# Patient Record
Sex: FEMALE | Race: OTHER | HISPANIC OR LATINO | Employment: UNEMPLOYED | URBAN - METROPOLITAN AREA
[De-identification: names, ages, dates, MRNs, and addresses within clinical notes are randomized per-mention and may not be internally consistent; named-entity substitution may affect disease eponyms.]

---

## 2023-11-29 LAB
EXTERNAL HEMATOCRIT: 32.2 %
EXTERNAL HEMOGLOBIN: 11 G/DL
EXTERNAL HEPATITIS B SURFACE ANTIGEN: NONREACTIVE
EXTERNAL HIV-1/2 AB-AG: NORMAL
EXTERNAL PLATELET COUNT: 285 K/ÂΜL
EXTERNAL RUBELLA IGG QUANTITATION: NORMAL
HCV AB SER-ACNC: NEGATIVE

## 2024-03-29 ENCOUNTER — ROUTINE PRENATAL (OUTPATIENT)
Dept: OBGYN CLINIC | Facility: CLINIC | Age: 34
End: 2024-03-29

## 2024-03-29 VITALS
SYSTOLIC BLOOD PRESSURE: 95 MMHG | WEIGHT: 169 LBS | HEIGHT: 58 IN | HEART RATE: 88 BPM | RESPIRATION RATE: 18 BRPM | BODY MASS INDEX: 35.48 KG/M2 | DIASTOLIC BLOOD PRESSURE: 63 MMHG

## 2024-03-29 DIAGNOSIS — Z3A.25 25 WEEKS GESTATION OF PREGNANCY: Primary | ICD-10-CM

## 2024-03-29 PROBLEM — J45.909 ASTHMA: Status: ACTIVE | Noted: 2024-03-29

## 2024-03-29 PROCEDURE — PNV: Performed by: OBSTETRICS & GYNECOLOGY

## 2024-03-29 RX ORDER — ALBUTEROL SULFATE 90 UG/1
AEROSOL, METERED RESPIRATORY (INHALATION)
COMMUNITY
Start: 2024-02-16

## 2024-03-29 NOTE — PROGRESS NOTES
OB/GYN  PRENATAL H&P VISIT  Naresh Sarabia  3/29/2024  8:06 AM  Dr. Georgina Jasso MD      SUBJECTIVE  Patient is a  at 25w5d here for initial prenatal H&P. This is an intended and desired pregnancy. Patient is in a same-sex relationship and used a sperm donor and home insemination. They have spoken to a  about their plan for the donor's termination of parental rights, however they were told they need to wait until the baby is born.    She is currently doing well. She lives in an apartment with her wife. She works as a . She bends and lifts for work, otherwise she feels safe in her home and her work.     She denies a hx of STD/STI, denies a hx of TB or close contacts with persons with TB. She denies had MRSA.     She denies a family history of inheritable conditions such as physical or intellectual disabilities, birth defects, blood disorders, heart or neural tube defects. Two nieces with autism, one is her sister's daughter (mild) and the other is her brother's daughter (slightly more symptoms, however is functional and verbal).    She denies recent travel or travel planned in the near future.     She denies use of nicotine or recreational drug use. She denies use of ETOH.    She denies vaginal bleeding, cramping, leakage, abnormal discharge.     OB History   No obstetric history on file.       Review of Systems   Constitutional:  Negative for chills and fever.   HENT:  Negative for ear pain and sore throat.    Eyes:  Negative for pain and visual disturbance.   Respiratory:  Negative for cough and shortness of breath (Asthma, for which she sporadically uses inhalers).    Cardiovascular:  Negative for chest pain.   Gastrointestinal:  Negative for abdominal pain, blood in stool, constipation, diarrhea, nausea and vomiting.   Genitourinary:  Negative for dysuria and hematuria.   Musculoskeletal:  Negative for arthralgias and back pain.   Skin:  Negative for color change and rash.    Neurological:  Negative for seizures and syncope.   All other systems reviewed and are negative.      Past Medical History:   Diagnosis Date    Asthma        No past surgical history on file.    Social History     Socioeconomic History    Marital status: Single     Spouse name: Not on file    Number of children: Not on file    Years of education: Not on file    Highest education level: Not on file   Occupational History    Not on file   Tobacco Use    Smoking status: Never    Smokeless tobacco: Not on file   Substance and Sexual Activity    Alcohol use: No    Drug use: No    Sexual activity: Not on file   Other Topics Concern    Not on file   Social History Narrative    Not on file     Social Determinants of Health     Financial Resource Strain: Low Risk  (3/28/2024)    Overall Financial Resource Strain (CARDIA)     Difficulty of Paying Living Expenses: Not very hard   Food Insecurity: Patient Declined (3/28/2024)    Hunger Vital Sign     Worried About Running Out of Food in the Last Year: Patient declined     Ran Out of Food in the Last Year: Patient declined   Transportation Needs: No Transportation Needs (3/28/2024)    PRAPARE - Transportation     Lack of Transportation (Medical): No     Lack of Transportation (Non-Medical): No   Physical Activity: Not on file   Stress: Not on file   Social Connections: Not on file   Intimate Partner Violence: Not on file   Housing Stability: Patient Declined (3/28/2024)    Housing Stability Vital Sign     Unable to Pay for Housing in the Last Year: Patient declined     Number of Places Lived in the Last Year: 1     Unstable Housing in the Last Year: Patient declined       OBJECTIVE  Vitals:    03/29/24 0815   BP: 95/63   Pulse: 88   Resp: 18     GEN: The patient was alert, pleasant well-appearing female in no acute distress.   CV: Regular rate  PULM: nonlabored respirations  MSK: Normal gait  Skin: warm, dry  Neuro: no focal deficits  Psych: normal affect and judgement,  "cooperative      ASSESSMENT AND PLAN    33 y.o., , with BP 95/63 (BP Location: Left arm, Patient Position: Sitting, Cuff Size: Large)   Pulse 88   Resp 18   Ht 4' 10.25\" (1.48 m)   Wt 76.7 kg (169 lb) , at 25w5d here for her prenatal H&P.    Fundal Height: 24cm  FHT: 130 by Doppler    Pregnancy: H&P completed today. [facility] Labor expectations discussed with patient, including appointment schedule, nutrition, exercise, medications, sexual intercourse, and nausea/vomiting.    Weight gain: Patient's BMI is 35; however this is not her pre-pregnancy BMI. Plan now to watch for weight gain; plan for 11-20lbs.     Screening: Pap smear due; patient is requesting to complete it after pregnancy. Last pap smear 2018, NILM. GC/CT neg in early pregnancy. Genetic screening reviewed with patient - NIPT low risk, [facility]     Depression Screening Follow-up Plan: Patient's depression screening was positive with a PHQ-2 score of 0. Their PHQ-9 score was 0. Clinically patient does not have depression. No treatment is required.    Consents: Delivery process including potential OVD and  reviewed. Sign delivery consent form at 28 weeks.    Labor: For analgesia, patient considering epidural.    Contraception: Different methods of contraception were discussed with patient, including progesterone only oral pills, depo provera, nexplanon, mirena, and paragard. Patient is in a same-sex relationship and does not plan on using contraception after delivery.     Follow up: RTC in 2 weeks. Precautions regarding labor, leakage, bleeding, and fetal movement reviewed.    Georgina Jasso MD  3/29/2024  8:06 AM       "

## 2024-03-29 NOTE — LETTER
March 29, 2024     Patient: Naresh Sarabia  Date of Visit: 3/29/2024      To Whom it May Concern:    Naresh Sarabia is under my professional care. Naresh was seen in my office on 3/29/2024.    We recommend that all pregnant women:    1. Have a well-ventilated workspace.  2. Wear low-heeled shoes.  3. Work no more than 40 hours per week.  4. Have a 15 minute break every 2 hours and at least 30 minutes for a meal break.   5. Use good body mechanics by bending at your knees to avoid back strain and lift no more than 20 pounds without assistance.  6. Have ready access to bathrooms and water.    She may continue to work until her due date unless medical complications arise. We anticipate she may return to work in 6-8 weeks after delivery.         Sincerely,     Georgina Jasso MD

## 2024-03-31 PROBLEM — Z3A.25 25 WEEKS GESTATION OF PREGNANCY: Status: ACTIVE | Noted: 2024-03-31

## 2024-03-31 PROBLEM — Z78.9 NONIMMUNE TO HEPATITIS B VIRUS: Status: ACTIVE | Noted: 2024-03-31

## 2024-04-04 ENCOUNTER — TELEPHONE (OUTPATIENT)
Dept: OBGYN CLINIC | Facility: CLINIC | Age: 34
End: 2024-04-04

## 2024-04-04 DIAGNOSIS — Z34.92 PRENATAL CARE IN SECOND TRIMESTER: Primary | ICD-10-CM

## 2024-04-04 NOTE — TELEPHONE ENCOUNTER
Pt called office today requesting a more descriptive work note. Pt states the various positions at work are making her sore. I advised pt to message provider on my chart to explain exactly what limitations she is requesting.

## 2024-04-05 ENCOUNTER — TELEPHONE (OUTPATIENT)
Age: 34
End: 2024-04-05

## 2024-04-08 ENCOUNTER — APPOINTMENT (OUTPATIENT)
Dept: LAB | Facility: CLINIC | Age: 34
End: 2024-04-08
Payer: COMMERCIAL

## 2024-04-08 DIAGNOSIS — Z3A.25 25 WEEKS GESTATION OF PREGNANCY: ICD-10-CM

## 2024-04-08 LAB
ERYTHROCYTE [DISTWIDTH] IN BLOOD BY AUTOMATED COUNT: 13.3 % (ref 11.6–15.1)
GLUCOSE 1H P 50 G GLC PO SERPL-MCNC: 183 MG/DL (ref 70–134)
HCT VFR BLD AUTO: 32 % (ref 34.8–46.1)
HGB BLD-MCNC: 10.6 G/DL (ref 11.5–15.4)
MCH RBC QN AUTO: 31.7 PG (ref 26.8–34.3)
MCHC RBC AUTO-ENTMCNC: 33.1 G/DL (ref 31.4–37.4)
MCV RBC AUTO: 96 FL (ref 82–98)
PLATELET # BLD AUTO: 239 THOUSANDS/UL (ref 149–390)
PMV BLD AUTO: 11.2 FL (ref 8.9–12.7)
RBC # BLD AUTO: 3.34 MILLION/UL (ref 3.81–5.12)
TREPONEMA PALLIDUM IGG+IGM AB [PRESENCE] IN SERUM OR PLASMA BY IMMUNOASSAY: NORMAL
WBC # BLD AUTO: 11.14 THOUSAND/UL (ref 4.31–10.16)

## 2024-04-08 PROCEDURE — 83020 HEMOGLOBIN ELECTROPHORESIS: CPT

## 2024-04-08 PROCEDURE — 82950 GLUCOSE TEST: CPT

## 2024-04-08 PROCEDURE — 85027 COMPLETE CBC AUTOMATED: CPT

## 2024-04-08 PROCEDURE — 36415 COLL VENOUS BLD VENIPUNCTURE: CPT

## 2024-04-08 PROCEDURE — 86780 TREPONEMA PALLIDUM: CPT

## 2024-04-09 ENCOUNTER — TELEPHONE (OUTPATIENT)
Age: 34
End: 2024-04-09

## 2024-04-09 DIAGNOSIS — O24.419 GESTATIONAL DIABETES MELLITUS (GDM) IN SECOND TRIMESTER, GESTATIONAL DIABETES METHOD OF CONTROL UNSPECIFIED: Primary | ICD-10-CM

## 2024-04-10 ENCOUNTER — TELEPHONE (OUTPATIENT)
Age: 34
End: 2024-04-10

## 2024-04-10 LAB
HGB A MFR BLD: 2.2 % (ref 1.8–3.2)
HGB A MFR BLD: 97.8 % (ref 96.4–98.8)
HGB F MFR BLD: 0 % (ref 0–2)
HGB FRACT BLD-IMP: NORMAL
HGB S MFR BLD: 0 %

## 2024-04-10 NOTE — TELEPHONE ENCOUNTER
Spoke with patient to schedule appointments for her today. She seen her glucose level on her my chart. She has concerns about the GDM affecting her babies weight and if there are any other side effects that could hurt the baby long term due to her GDM.

## 2024-04-11 ENCOUNTER — PATIENT MESSAGE (OUTPATIENT)
Dept: PERINATAL CARE | Facility: CLINIC | Age: 34
End: 2024-04-11

## 2024-04-11 ENCOUNTER — TELEMEDICINE (OUTPATIENT)
Dept: PERINATAL CARE | Facility: CLINIC | Age: 34
End: 2024-04-11
Payer: COMMERCIAL

## 2024-04-11 DIAGNOSIS — O99.212 OTHER OBESITY DUE TO EXCESS CALORIES AFFECTING PREGNANCY IN SECOND TRIMESTER: ICD-10-CM

## 2024-04-11 DIAGNOSIS — E66.09 OTHER OBESITY DUE TO EXCESS CALORIES AFFECTING PREGNANCY IN SECOND TRIMESTER: ICD-10-CM

## 2024-04-11 DIAGNOSIS — O24.410 DIET CONTROLLED GESTATIONAL DIABETES MELLITUS (GDM) IN SECOND TRIMESTER: Primary | ICD-10-CM

## 2024-04-11 DIAGNOSIS — Z3A.27 27 WEEKS GESTATION OF PREGNANCY: ICD-10-CM

## 2024-04-11 PROCEDURE — G0109 DIAB MANAGE TRN IND/GROUP: HCPCS | Performed by: DIETITIAN, REGISTERED

## 2024-04-11 RX ORDER — BLOOD SUGAR DIAGNOSTIC
STRIP MISCELLANEOUS
Qty: 100 STRIP | Refills: 5 | Status: SHIPPED | OUTPATIENT
Start: 2024-04-11 | End: 2024-07-07

## 2024-04-11 RX ORDER — LANCETS 33 GAUGE
EACH MISCELLANEOUS
Qty: 100 EACH | Refills: 5 | Status: SHIPPED | OUTPATIENT
Start: 2024-04-11 | End: 2024-07-07

## 2024-04-11 RX ORDER — BLOOD-GLUCOSE METER
EACH MISCELLANEOUS
Qty: 1 KIT | Refills: 0 | Status: SHIPPED | OUTPATIENT
Start: 2024-04-11 | End: 2024-07-07

## 2024-04-11 NOTE — PROGRESS NOTES
Virtual Regular Visit    Verification of patient location: [place]    Patient is located at Home in the following state in which I hold an active license [place]      Assessment/Plan:    Problem List Items Addressed This Visit    None           Reason for visit is   Chief Complaint   Patient presents with    Virtual Regular Visit          Encounter provider Susu Husain    Provider located at [address] [address] [phone]      Recent Visits  No visits were found meeting these conditions.  Showing recent visits within past 7 days and meeting all other requirements  Today's Visits  Date Type Provider Dept   24 Telemedicine Susu Husain [facility]   Showing today's visits and meeting all other requirements  Future Appointments  No visits were found meeting these conditions.  Showing future appointments within next 150 days and meeting all other requirements       The patient was identified by name and date of birth. Naresh Sarabia was informed that this is a telemedicine visit and that the visit is being conducted through the [employer] platform. She agrees to proceed..  My office door was closed. No one else was in the room.  She acknowledged consent and understanding of privacy and security of the video platform. The patient has agreed to participate and understands they can discontinue the visit at any time.    Patient is aware this is a billable service.     Subjective  Naresh Sarabia is a 33 y.o. female pregnant patient.      HPI     Past Medical History:   Diagnosis Date    Asthma        Past Surgical History:   Procedure Laterality Date    LASIK Bilateral        Current Outpatient Medications   Medication Sig Dispense Refill    albuterol (PROVENTIL HFA,VENTOLIN HFA) 90 mcg/act inhaler       Aspirin 81 MG CAPS Take by mouth      FOLIC ACID PO Take by mouth      Omega-3 Fatty Acids (FISH OIL  "CONCENTRATE PO) Take by mouth      Prenat MV-Min w/Fe-Folate-DHA (PRENATAL COMPLETE PO) Take by mouth       No current facility-administered medications for this visit.        Allergies   Allergen Reactions    Other Rash, Shortness Of Breath and Wheezing       Review of Systems    Video Exam    There were no vitals filed for this visit.    Physical Exam --not performed.    Visit Time  Total Visit Duration: 60 minutes        Thank you for referring your patient to [facility] Maternal [facility]etal Medicine Diabetes in Pregnancy Program.     Naresh Sarabia is a  33 y.o. female who presents today for Group Class 1.  Patient is at 27w4d gestation, Estimated Date of Delivery: 24.     Reviewed and updated the following from patients medical record: PMH, Problem List, Allergies, and Current Medications.    Visit Diagnosis:  Diet controlled GDM    Discussed with patient pathophysiology of GDM, untreated hyperglycemia in pregnancy and maternal fetal complications including fetal macrosomia,  hypoglycemia, polyhydramnios, increased incidence of  section,  labor, and in severe cases fetal demise and still birth . Discussed importance of blood glucose monitoring, nutrition, and medication if necessary in achieving BG goals.     Additional Pregnancy Complications:  Obesity    Labs:    Lab Results   Component Value Date    NCE3OEUS03SG 183 (H) 2024       No results found for: \"GLUF\", \"ZABPJBB8IF\", \"PXTCWNV7RG\", \"NXSPLDR9ZC\"     No components found for: \"HGA1C\"    Medications:  No diabetes related medications    Anthropometrics:  Ht Readings from Last 3 Encounters:   24 4' 10.25\" (1.48 m)     Wt Readings from Last 3 Encounters:   24 76.7 kg (169 lb)   10/11/16 59.4 kg (130 lb 15.3 oz)     Pre-gravid weight: 153 pounds at 23 appointment at [facility]   Pre-gravid BMI: 30.9  Weight Change: gained 16 pounds  Weight gain recommendations: BMI (> 30) 11-20 lbs  Comments: may gain 4 more pounds for " the remainder of the pregnancy    Recent Ultra Sound Results:  Next US date: 24    Blood Glucose Monitoring:   Glucose Meter: OneTouch Verio Flex  Instructed on testing blood sugars: 4 x per day (Fasting, 2 hour after start of each meal)    Gave instruction on site selection, skin preparation, loading strips and lancet device, meter activation, obtaining blood sample, test strip and lancet disposal and storage, and recording log book entries. Patient has good understanding of material covered and was able to test their own blood sugar in office today.     Instruction for reporting blood sugar results weekly via:  Phone: [phone]   OR  My Chart (Message with image attachment, or Glucose Flowsheet)    Goal Blood Sugar Ranges:   Fastin-90 mg/dL  1 hour after the start of each meal: 140 mg/dL or less  2 hours after start of each meal: 120 mg/dL or less    Meal Plan (daily calorie and protein needs):  Calories: 1800 calorie (CHO: 45-15-45-15-45-30) (PRO:2-1-3-1-3-2)    Type of Diet:Regular  Additional Nutrition Concerns: none    Meal Plan Tips:  1. Patient was provided with a meal plan including 3 meals and 3 snacks.  2. Discussed appropriate amounts of CHO, PRO, and Fat at each meal and snack.   3. Reviewed CHO exchange list, and portion sizes for both CHO and PRO via food models  4. Instruction on how to read a food label  5. Provided suggested meal/snack options to increase nutrition and maintain consistent meal and snack intakes.  6. Instructed on how to keep a 3-day food diary to be brought to follow- up appointment.   7. Encouraged  patient to eat every 2.0-3.5 hours while awake  8. Encouraged patient to go no longer than 8-10 hours fasting overnight until first meal of the day.      Physical Activity:  Discussed benefits of physical activity to optimize blood glucose control, encouraged activity at patient is physically able. Always consult a physician prior to starting an exercise program.  "Recommend 20-30 minutes daily.    Patient Stated Goal: \"I will involve my family in my diabetes care\"    Diabetes Self Management Support Plan outside of ongoing care: Spouse/Family    Learner/s Present:Learners Present: Patient   Barriers to Learning/Change: No Barriers  Expected Compliance: good    Date to report blood sugars: Wednesday, 4/17/24  Class 2 (date): Thursday, 4/18/23    Begin Time: 1:05 PM  End Time: 2:10 PM    It was a pleasure working with them today. Please feel free to call with any questions or concerns.    Susu Husain, MS, RD, [facility]  Diabetes Educator  [facility]  Diabetes in Pregnancy Program  [address], [address]  [place] [address]         "

## 2024-04-15 ENCOUNTER — ROUTINE PRENATAL (OUTPATIENT)
Dept: OBGYN CLINIC | Facility: CLINIC | Age: 34
End: 2024-04-15

## 2024-04-15 VITALS
BODY MASS INDEX: 35.26 KG/M2 | DIASTOLIC BLOOD PRESSURE: 57 MMHG | HEART RATE: 97 BPM | SYSTOLIC BLOOD PRESSURE: 97 MMHG | WEIGHT: 168 LBS | HEIGHT: 58 IN

## 2024-04-15 DIAGNOSIS — Z3A.28 28 WEEKS GESTATION OF PREGNANCY: Primary | ICD-10-CM

## 2024-04-15 DIAGNOSIS — O99.019 ANTEPARTUM ANEMIA: ICD-10-CM

## 2024-04-15 DIAGNOSIS — O24.410 DIET CONTROLLED GESTATIONAL DIABETES MELLITUS (GDM) IN SECOND TRIMESTER: ICD-10-CM

## 2024-04-15 PROCEDURE — PNV: Performed by: OBSTETRICS & GYNECOLOGY

## 2024-04-15 RX ORDER — FERROUS SULFATE 324(65)MG
324 TABLET, DELAYED RELEASE (ENTERIC COATED) ORAL
Qty: 90 TABLET | Refills: 1 | Status: SHIPPED | OUTPATIENT
Start: 2024-04-15

## 2024-04-15 NOTE — PROGRESS NOTES
28 week education packet provided to patient on 04/15/24.    Included in packet:  Third Trimester paperwork  Delivery consent   Birthing room support person rules and acknowledgment  Birth Plan   Welcome information  Birth certificate worksheet   Consent for Photographers  Perineal/ Vaginal massage   Pediatric practices and locations      Delined TDAP today  Breast pump ordrd / will choose from link

## 2024-04-15 NOTE — PROGRESS NOTES
OB/GYN  PN Visit  Naresh Sarabia  [id number]  4/15/2024  12:14 PM  Dr. Modesta Lilly, DO    S: 33 y.o.  28w1d here for PN visit. She denies contractions. She denies  leakage of fluid and vaginal bleeding, endorses discharge throughout the pregnancy. She endorses good fetal movement. Her pregnancy is complicatedby gestational diabetes, diet controlled.     She requests paperwork be completed for work stating she is not to lift greater than 20lbs or bend/squat at work. She will provide the paperwork from her job.     O:  Vitals:    04/15/24 1100   BP: 97/57   Pulse: 97     Physical Exam  Constitutional:       General: She is not in acute distress.     Appearance: Normal appearance. She is not ill-appearing or toxic-appearing.   HENT:      Head: Normocephalic and atraumatic.      Right Ear: External ear normal.      Left Ear: External ear normal.      Nose: Nose normal.      Mouth/Throat:      Mouth: Mucous membranes are moist.      Pharynx: Oropharynx is clear.   Eyes:      General: No scleral icterus.     Conjunctiva/sclera: Conjunctivae normal.   Cardiovascular:      Rate and Rhythm: Normal rate and regular rhythm.      Heart sounds: Normal heart sounds. No murmur heard.  Pulmonary:      Effort: Pulmonary effort is normal. No respiratory distress.      Breath sounds: Normal breath sounds. No wheezing, rhonchi or rales.   Abdominal:      General: Bowel sounds are normal.      Palpations: Abdomen is soft.      Tenderness: There is no abdominal tenderness. There is no guarding.      Comments: gravid   Musculoskeletal:         General: Normal range of motion.      Cervical back: Neck supple.      Right lower leg: Edema (nonpitting) present.      Left lower leg: Edema (nonpitting) present.   Skin:     General: Skin is warm and dry.      Coloration: Skin is not jaundiced.   Neurological:      General: No focal deficit present.      Mental Status: She is alert and oriented to person, place, and time.    Psychiatric:         Mood and Affect: Mood normal.         Behavior: Behavior normal.       Fundal height: 29cm  FHT: 143     A/P:  1. 28w1d GESTATION  - Offered Tdap today, pt defers until next visit  - Labor precautions reviewed  - Fetal kick counts reviewed  -Fetal growth scans scheduled for  and   - RTC in 2 weeks    2. Gestational diabetes   -1 hour glucose: 183, patient has been checking blood sugars at home, diet controlled. Requests repeat today  -3 hour glucola ordered today  -appointment with GDM counseling on       Future Appointments   Date Time Provider Department Center   2024 10:00 AM Susu Husain   [facility]   2024  9:45 AM  US 1 HARESH  PERINATMO [facility]   2024 10:30 AM EDE [facility]   2024 10:00 AM  US 1 JEAN-PAUL AN [facility]  4/15/2024  12:14 PM

## 2024-04-16 LAB
DME PARACHUTE DELIVERY DATE REQUESTED: NORMAL
DME PARACHUTE ITEM DESCRIPTION: NORMAL
DME PARACHUTE ORDER STATUS: NORMAL
DME PARACHUTE SUPPLIER NAME: NORMAL
DME PARACHUTE SUPPLIER PHONE: NORMAL

## 2024-04-18 ENCOUNTER — TELEMEDICINE (OUTPATIENT)
Dept: PERINATAL CARE | Facility: CLINIC | Age: 34
End: 2024-04-18
Payer: COMMERCIAL

## 2024-04-18 DIAGNOSIS — O24.410 DIET CONTROLLED GESTATIONAL DIABETES MELLITUS (GDM) IN SECOND TRIMESTER: Primary | ICD-10-CM

## 2024-04-18 DIAGNOSIS — O99.213 OTHER OBESITY DUE TO EXCESS CALORIES AFFECTING PREGNANCY IN THIRD TRIMESTER: ICD-10-CM

## 2024-04-18 DIAGNOSIS — Z3A.28 28 WEEKS GESTATION OF PREGNANCY: ICD-10-CM

## 2024-04-18 DIAGNOSIS — E66.09 OTHER OBESITY DUE TO EXCESS CALORIES AFFECTING PREGNANCY IN THIRD TRIMESTER: ICD-10-CM

## 2024-04-18 PROCEDURE — G0108 DIAB MANAGE TRN  PER INDIV: HCPCS | Performed by: DIETITIAN, REGISTERED

## 2024-04-23 ENCOUNTER — ULTRASOUND (OUTPATIENT)
Dept: PERINATAL CARE | Facility: OTHER | Age: 34
End: 2024-04-23
Payer: COMMERCIAL

## 2024-04-23 VITALS
BODY MASS INDEX: 34.19 KG/M2 | WEIGHT: 169.6 LBS | DIASTOLIC BLOOD PRESSURE: 48 MMHG | HEART RATE: 94 BPM | HEIGHT: 59 IN | SYSTOLIC BLOOD PRESSURE: 98 MMHG

## 2024-04-23 DIAGNOSIS — O24.410 DIET CONTROLLED GESTATIONAL DIABETES MELLITUS (GDM) IN THIRD TRIMESTER: Primary | ICD-10-CM

## 2024-04-23 DIAGNOSIS — O98.511 COVID-19 AFFECTING PREGNANCY IN FIRST TRIMESTER: ICD-10-CM

## 2024-04-23 DIAGNOSIS — U07.1 COVID-19 AFFECTING PREGNANCY IN FIRST TRIMESTER: ICD-10-CM

## 2024-04-23 DIAGNOSIS — Z3A.29 29 WEEKS GESTATION OF PREGNANCY: ICD-10-CM

## 2024-04-23 PROBLEM — J45.909 MATERNAL ASTHMA COMPLICATING PREGNANCY: Status: ACTIVE | Noted: 2023-10-30

## 2024-04-23 PROBLEM — O99.519 MATERNAL ASTHMA COMPLICATING PREGNANCY: Status: ACTIVE | Noted: 2023-10-30

## 2024-04-23 PROCEDURE — 76816 OB US FOLLOW-UP PER FETUS: CPT | Performed by: OBSTETRICS & GYNECOLOGY

## 2024-04-23 PROCEDURE — 99244 OFF/OP CNSLTJ NEW/EST MOD 40: CPT | Performed by: OBSTETRICS & GYNECOLOGY

## 2024-04-23 NOTE — LETTER
April 23, 2024     Evelyn Mendoza DO  [address]  [address]  [facility] [facility] [facility] [address]    Patient: Naresh Sarabia   YOB: 1990   Date of Visit: 4/23/2024       Dear Dr. Mendoza:    Thank you for referring Naresh Sarabia to me for evaluation. Below are my notes for this consultation.    If you have questions, please do not hesitate to call me. I look forward to following your patient along with you.         Sincerely,        Brianna Beck MD        CC: No Recipients    Brianna Beck MD  4/23/2024  7:50 PM  Incomplete  Naresh Sarabia has symptoms of carpal tunnel affecting her first 3 digits.  She reports regular fetal movements and does not report any problems.  She is here today at 29w2d for an ultrasound for fetal growth.  She Is here today with Melly her spouse.    Problem list:  GDM A1-reported sugars from 4 11-4 17 show she is under good control on diet.  Glucola was 183 on 4/8/2024.  Hemoglobin A1c in early pregnancy on 1/29/2023 was 5.3.  She has had prediabetes based on hemoglobin A1c's of 5.7 and 5.9 in the past prior to pregnancy.   Recent transfer of care  Obesity based on a pregravid BMI of 30 on baby aspirin 81 mg daily  History of COVID in the first trimester of this pregnancy  Maternal anemia with a hemoglobin of 10.6 on 4/8/2024 and a normal hemoglobin fractionation    Naresh is a recent transfer of care from [facility] to [facility].  She currently is on iron, prenatal vitamins, aspirin 81 mg daily, folic acid, and omega-3 fatty acids and has an albuterol inhaler prn. She reports a history of asthma, GDM A1 diagnosed this pregnancy and hx of Lasix surgery.  Lab work from [facility] shows a mildly increased TSH of 3.79 on 11/29/2023. This is her first pregnancy with donor sperm.  She is in a same-sex relationship with Melly Gibson.  She reports no significant family history.  She reports no substance use.    Ultrasound  findings:  The ultrasound today shows normal interval fetal growth and fluid.  She had a complete anatomy scan at [facility].     Pregnancy ultrasound has limitations and is unable to detect all forms of fetal congenital abnormalities.  The inaccuracy in the EFW can be off by 1 lb either way in the third trimester.    Specific counseling was provided on the following problems:  1.  US for growth and fluid are appropriate today. If 30% or more FBS are >95 or 2 hr pp are >120 she will be started on insulin or a oral hypoglycemic such as metformin.  If medications are required to control her diabetes she will require twice weekly fetal testing with NST's from 32 weeks on. I would also recommend delivery by her due date.    If she does not require any medications to control her diabetes then she can liver can occur between 39 to 41 weeks.  Recommend fetal testing start at 40 weeks.     Postnatally after delivery, most patients with gestational diabetes can stop their insulin and gestational diabetes diet. Recommend she complete a 2 hour glucose tolerance test at 6 weeks postpartum prove her gestational diabetes has resolved.    Patients with gestational diabetes have a higher risk for developing overt diabetes in the future. Recommend she be screened for diabetes yearly.  2.  Carpal tunnel usually will resolve when pregnancy is over.  Review of her hands shows normal padding over her thumb suggesting adequate innervation.  Suggested she invest in wrist guards to keep her wrist straight.  Recommend she use them when she is sleeping and can use them all day long if they improve her symptoms.     Follow up recommended:   Recommend a follow-up ultrasound in 5 weeks for growth.  She currently is on aspirin 81 mg for the indication of COVID she thought in pregnancy.  Based on her BMI pregravid of 30 and this being her first pregnancy it is reasonable to continue this dose also to decrease her risk for developing  preeclampsia.  There will be no benefit at this point to increase her dose to 162 mg daily.  Recommend stopping at 36 weeks and 0 days.    Pre visit time reviewing her records   7 minutes  Face to face time 14 minutes  Post visit time on documentation of note, updating her problem list, adding orders and prescriptions 10 minutes.  Procedures that were completed today were charged separately.   The level of decision making was low complexity    Brianna Beck MD

## 2024-04-23 NOTE — LETTER
April 23, 2024     Evelyn Mendoza DO  [address]  [address]  [place] [address]    Patient: Naresh Sarabia   YOB: 1990   Date of Visit: 4/23/2024       Dear Dr. Mendoza:    Thank you for referring Naresh Sarabia to me for evaluation. Below are my notes for this consultation.    If you have questions, please do not hesitate to call me. I look forward to following your patient along with you.         Sincerely,        Brianna Beck MD        CC: No Recipients    Brianna Beck MD  4/23/2024  7:47 PM  Incomplete  Naresh Sarabia has symptoms of carpal tunnel affecting her first 3 digits.  She reports regular fetal movements and does not report any problems.  She is here today at 29w2d for an ultrasound for fetal growth.  She Is here today with Melly her spouse.    Problem list:  GDM A1-reported sugars from 4 11-4 17 show she is under good control on diet.  Glucola was 183 on 4/8/2024.  Hemoglobin A1c in early pregnancy on 1/29/2023 was 5.3.  She has had prediabetes based on hemoglobin A1c's of 5.7 and 5.9 in the past prior to pregnancy.   Recent transfer of care  Obesity based on a pregravid BMI of 30 on baby aspirin 81 mg daily  History of COVID in the first trimester of this pregnancy  Maternal anemia with a hemoglobin of 10.6 on 4/8/2024 and a normal hemoglobin fractionation    Naresh is a recent transfer of care from [facility] to [facility].  She currently is on iron, prenatal vitamins, aspirin 81 mg daily, folic acid, and omega-3 fatty acids and has an albuterol inhaler prn. She reports a history of asthma, GDM A1 diagnosed this pregnancy and hx of Lasix surgery.  Lab work from [facility] shows a mildly increased TSH of 3.79 on 11/29/2023. This is her first pregnancy with donor sperm.  She is in a same-sex relationship with Melly Gibson.  She reports no significant family history.  She reports no substance use.    Ultrasound  findings:  The ultrasound today shows normal interval fetal growth and fluid.  She had a complete anatomy scan at [facility].     Pregnancy ultrasound has limitations and is unable to detect all forms of fetal congenital abnormalities.  The inaccuracy in the EFW can be off by 1 lb either way in the third trimester.    Specific counseling was provided on the following problems:  1.  US for growth and fluid are appropriate today. If 30% or more FBS are >95 or 2 hr pp are >120 she will be started on insulin or a oral hypoglycemic such as metformin.  If medications are required to control her diabetes she will require twice weekly fetal testing with NST's from 32 weeks on. I would also recommend delivery by her due date.    If she does not require any medications to control her diabetes then she can liver can occur between 39 to 41 weeks.  Recommend fetal testing start at 40 weeks.     Postnatally after delivery, most patients with gestational diabetes can stop their insulin and gestational diabetes diet. Recommend she complete a 2 hour glucose tolerance test at 6 weeks postpartum prove her gestational diabetes has resolved.    Patients with gestational diabetes have a higher risk for developing overt diabetes in the future. Recommend she be screened for diabetes yearly.  2.  Carpal tunnel usually will resolve when pregnancy is over.  Review of her hands shows normal padding over her thumb suggesting adequate innervation.  Suggested she invest in wrist guards to keep her wrist straight.  Recommend she use them when she is sleeping and can use them all day long if they improve her symptoms.     Follow up recommended:   Recommend a follow-up ultrasound in 5 weeks for growth.  She currently is on aspirin 81 mg for the indication of COVID she thought in pregnancy.  Based on her BMI pregravid of 30 and this being her first pregnancy it is reasonable to continue this dose also to decrease her risk for developing  preeclampsia.  There will be no benefit at this point to increase her dose to 162 mg daily.    Pre visit time reviewing her records   7 minutes  Face to face time 14 minutes  Post visit time on documentation of note, updating her problem list, adding orders and prescriptions 10 minutes.  Procedures that were completed today were charged separately.   The level of decision making was low complexity    Brianna Beck MD

## 2024-04-23 NOTE — PROGRESS NOTES
Naresh Sarabia has symptoms of carpal tunnel affecting her first 3 digits.  She reports regular fetal movements and does not report any problems.  She is here today at 29w2d for an ultrasound for fetal growth.  She Is here today with Melly her spouse.    Problem list:  GDM A1-reported sugars from 4 11-4 17 show she is under good control on diet.  Glucola was 183 on 4/8/2024.  Hemoglobin A1c in early pregnancy on 1/29/2023 was 5.3.  She has had prediabetes based on hemoglobin A1c's of 5.7 and 5.9 in the past prior to pregnancy.   Recent transfer of care  Obesity based on a pregravid BMI of 30 on baby aspirin 81 mg daily  History of COVID in the first trimester of this pregnancy  Maternal anemia with a hemoglobin of 10.6 on 4/8/2024 and a normal hemoglobin fractionation    Naresh is a recent transfer of care from [facility] to [facility].  She currently is on iron, prenatal vitamins, aspirin 81 mg daily, folic acid, and omega-3 fatty acids and has an albuterol inhaler prn. She reports a history of asthma, GDM A1 diagnosed this pregnancy and hx of Lasix surgery.  Lab work from [facility] shows a mildly increased TSH of 3.79 on 11/29/2023. This is her first pregnancy with donor sperm.  She is in a same-sex relationship with Melly Gibson.  She reports no significant family history.  She reports no substance use.    Ultrasound findings:  The ultrasound today shows normal interval fetal growth and fluid.  She had a complete anatomy scan at [facility].     Pregnancy ultrasound has limitations and is unable to detect all forms of fetal congenital abnormalities.  The inaccuracy in the EFW can be off by 1 lb either way in the third trimester.    Specific counseling was provided on the following problems:  1.  US for growth and fluid are appropriate today. If 30% or more FBS are >95 or 2 hr pp are >120 she will be started on insulin or a oral hypoglycemic such as metformin.  If  medications are required to control her diabetes she will require twice weekly fetal testing with NST's from 32 weeks on. I would also recommend delivery by her due date.    If she does not require any medications to control her diabetes then she can liver can occur between 39 to 41 weeks.  Recommend fetal testing start at 40 weeks.     Postnatally after delivery, most patients with gestational diabetes can stop their insulin and gestational diabetes diet. Recommend she complete a 2 hour glucose tolerance test at 6 weeks postpartum prove her gestational diabetes has resolved.    Patients with gestational diabetes have a higher risk for developing overt diabetes in the future. Recommend she be screened for diabetes yearly.  2.  Carpal tunnel usually will resolve when pregnancy is over.  Review of her hands shows normal padding over her thumb suggesting adequate innervation.  Suggested she invest in wrist guards to keep her wrist straight.  Recommend she use them when she is sleeping and can use them all day long if they improve her symptoms.     Follow up recommended:   Recommend a follow-up ultrasound in 5 weeks for growth.  She currently is on aspirin 81 mg for the indication of COVID she thought in pregnancy.  Based on her BMI pregravid of 30 and this being her first pregnancy it is reasonable to continue this dose also to decrease her risk for developing preeclampsia.  There will be no benefit at this point to increase her dose to 162 mg daily.  Recommend stopping at 36 weeks and 0 days.    Pre visit time reviewing her records   7 minutes  Face to face time 14 minutes  Post visit time on documentation of note, updating her problem list, adding orders and prescriptions 10 minutes.  Procedures that were completed today were charged separately.   The level of decision making was low complexity    Brianna Beck MD

## 2024-04-23 NOTE — LETTER
April 23, 2024     Evelyn Mendoza DO  [address]  [address]  [place] [address]    Patient: Naresh Sarabia   YOB: 1990   Date of Visit: 4/23/2024       Dear Dr. Mendoza:    Thank you for referring Naresh Sarabia to me for evaluation. Below are my notes for this consultation.    If you have questions, please do not hesitate to call me. I look forward to following your patient along with you.         Sincerely,        Brianna Beck MD        CC: No Recipients    Brianna Beck MD  4/23/2024  7:50 PM  Incomplete  Naresh Sarabia has symptoms of carpal tunnel affecting her first 3 digits.  She reports regular fetal movements and does not report any problems.  She is here today at 29w2d for an ultrasound for fetal growth.  She Is here today with Melly her spouse.    Problem list:  GDM A1-reported sugars from 4 11-4 17 show she is under good control on diet.  Glucola was 183 on 4/8/2024.  Hemoglobin A1c in early pregnancy on 1/29/2023 was 5.3.  She has had prediabetes based on hemoglobin A1c's of 5.7 and 5.9 in the past prior to pregnancy.   Recent transfer of care  Obesity based on a pregravid BMI of 30 on baby aspirin 81 mg daily  History of COVID in the first trimester of this pregnancy  Maternal anemia with a hemoglobin of 10.6 on 4/8/2024 and a normal hemoglobin fractionation    Naresh is a recent transfer of care from [facility] to [facility].  She currently is on iron, prenatal vitamins, aspirin 81 mg daily, folic acid, and omega-3 fatty acids and has an albuterol inhaler prn. She reports a history of asthma, GDM A1 diagnosed this pregnancy and hx of Lasix surgery.  Lab work from [facility] shows a mildly increased TSH of 3.79 on 11/29/2023. This is her first pregnancy with donor sperm.  She is in a same-sex relationship with Melly Gibson.  She reports no significant family history.  She reports no substance use.    Ultrasound  findings:  The ultrasound today shows normal interval fetal growth and fluid.  She had a complete anatomy scan at [facility].     Pregnancy ultrasound has limitations and is unable to detect all forms of fetal congenital abnormalities.  The inaccuracy in the EFW can be off by 1 lb either way in the third trimester.    Specific counseling was provided on the following problems:  1.  US for growth and fluid are appropriate today. If 30% or more FBS are >95 or 2 hr pp are >120 she will be started on insulin or a oral hypoglycemic such as metformin.  If medications are required to control her diabetes she will require twice weekly fetal testing with NST's from 32 weeks on. I would also recommend delivery by her due date.    If she does not require any medications to control her diabetes then she can liver can occur between 39 to 41 weeks.  Recommend fetal testing start at 40 weeks.     Postnatally after delivery, most patients with gestational diabetes can stop their insulin and gestational diabetes diet. Recommend she complete a 2 hour glucose tolerance test at 6 weeks postpartum prove her gestational diabetes has resolved.    Patients with gestational diabetes have a higher risk for developing overt diabetes in the future. Recommend she be screened for diabetes yearly.  2.  Carpal tunnel usually will resolve when pregnancy is over.  Review of her hands shows normal padding over her thumb suggesting adequate innervation.  Suggested she invest in wrist guards to keep her wrist straight.  Recommend she use them when she is sleeping and can use them all day long if they improve her symptoms.     Follow up recommended:   Recommend a follow-up ultrasound in 5 weeks for growth.  She currently is on aspirin 81 mg for the indication of COVID she thought in pregnancy.  Based on her BMI pregravid of 30 and this being her first pregnancy it is reasonable to continue this dose also to decrease her risk for developing  preeclampsia.  There will be no benefit at this point to increase her dose to 162 mg daily.  Recommend stopping at 36 weeks and 0 days.    Pre visit time reviewing her records   7 minutes  Face to face time 14 minutes  Post visit time on documentation of note, updating her problem list, adding orders and prescriptions 10 minutes.  Procedures that were completed today were charged separately.   The level of decision making was low complexity    Brianna Beck MD

## 2024-04-29 ENCOUNTER — PATIENT OUTREACH (OUTPATIENT)
Dept: OBGYN CLINIC | Facility: CLINIC | Age: 34
End: 2024-04-29

## 2024-04-29 ENCOUNTER — ROUTINE PRENATAL (OUTPATIENT)
Dept: OBGYN CLINIC | Facility: CLINIC | Age: 34
End: 2024-04-29

## 2024-04-29 VITALS
SYSTOLIC BLOOD PRESSURE: 107 MMHG | BODY MASS INDEX: 33.34 KG/M2 | DIASTOLIC BLOOD PRESSURE: 73 MMHG | HEIGHT: 59 IN | HEART RATE: 105 BPM | WEIGHT: 165.4 LBS

## 2024-04-29 DIAGNOSIS — Z3A.30 30 WEEKS GESTATION OF PREGNANCY: Primary | ICD-10-CM

## 2024-04-29 DIAGNOSIS — Z23 ENCOUNTER FOR IMMUNIZATION: ICD-10-CM

## 2024-04-29 DIAGNOSIS — Z34.93 PRENATAL CARE IN THIRD TRIMESTER: ICD-10-CM

## 2024-04-29 PROCEDURE — PNV: Performed by: NURSE PRACTITIONER

## 2024-04-29 PROCEDURE — 90715 TDAP VACCINE 7 YRS/> IM: CPT | Performed by: NURSE PRACTITIONER

## 2024-04-29 PROCEDURE — 90471 IMMUNIZATION ADMIN: CPT | Performed by: NURSE PRACTITIONER

## 2024-04-29 NOTE — PROGRESS NOTES
MICK MCMAHAN met with 35 y/o-M-G1-  Bilingual woman for pre alondra assessment. Pt resides with her spouse and reported both are happy with the intended and desired pregnancy. Pt is in same sex relation and reported she conceived by sperm donor and home insemination. Pt started pre alondra care at [facility]. Pt denies any usage or drug, alcohol or smoking. Pt denies any mental Health or Domestic violence Hx. Pt is employed full time and has Commercial Insurance. Pt denies any financial concerns or transportation issues.     Pt claimed her spouse and both her mom and MIL are supportive. Pt is working with an  and he will relinquish his right after delivery and her spouse will adopt the baby. Pt denies any needs at this time. MICK MCMAHAN explained her role and gave contact information. Pt was encouraged to call at any time needed.

## 2024-04-29 NOTE — PROGRESS NOTES
Naresh Sarabia presents today for routine OB visit at 30w1d. She is a . Pregnancy is complicated by GDM and she is following with [facility] and [facility].     Blood Pressure: 107/73  Wt=75 kg (165 lb 6.4 oz); Body mass index is 33.41 kg/m².; TWG=5.625 kg (12 lb 6.4 oz)  Fetal Heart Rate: 140; Fundal Height (cm): 30 cm  Abdomen: gravid, soft, non-tender.  She reports intermittent round ligament pain.  Denies uterine contractions.  Denies vaginal bleeding or leaking of fluid.  Reports adequate fetal movement of at least 10 movements in 2 hours once daily.  Scheduled for ultrasound 24.    Accepts Tdap vaccine today.   Reviewed common discomforts of third trimester and comfort measures. Recommended pregnancy support belt.   Reviewed premature labor precautions and fetal kick counts.  Advised to continue medications and return in 2 weeks.      Current Outpatient Medications   Medication Instructions    albuterol (PROVENTIL HFA,VENTOLIN HFA) 90 mcg/act inhaler     Aspirin 81 MG CAPS Oral    Blood Glucose Monitoring Suppl (OneTouch Verio Flex System) w/Device KIT Test 4 times daily    ferrous sulfate 324 mg, Oral, Daily before breakfast    FOLIC ACID PO Oral    Lancets (OneTouch Delica Plus [id number]) MISC Test 4 times daily    Omega-3 Fatty Acids (FISH OIL CONCENTRATE PO) Oral    OneTouch Verio test strip Test 4 times daily    Prenat MV-Min w/Fe-Folate-DHA (PRENATAL COMPLETE PO) Oral         Pregnancy Problems (from 24 to present)       Problem Noted Resolved    Diet controlled gestational diabetes mellitus (GDM) in third trimester 2024 by Evelyn Mendoza DO No    Overview Signed 2024  4:01 PM by Evelyn Mendoza DO     1 hr  - sent to [facility] for diabetic education         30 weeks gestation of pregnancy 3/31/2024 by Georgina Jasso MD No    Overview Addendum 2024 10:43 AM by EDE Stafford     Overview:  Labs  Pap smear due; pt requests collecting postpartum. GC/CT  neg  Prenatal panel collected 23; notable for:  Mild anemia, hemoglobin 11.09  Hepatitis B nonimmunity; rubella immunity  Early 1 hour GTT (2023) WNL  1 hour GTT (2023) elevated at 183  Urine culture WNL  ABO: O+, antibody negative  28w labs ordered: RPR negative, CBC shows Hgb 10.6, elevated 1 hour glucose  Hgb electrophoresis ordered   GBS to be collected @ 36 weeks   Vaccines:  Tdap vaccine: given 24  Influenza vaccine: flu vaccine season ended   Covid vaccine: received   Genetic screening  NIPT (2024): low risk  AFP declined per [facility] notes  Contraception: none, patient is in a same sex relationship   Feeding plan: breastfeeding   Birth plan:  @ [place], likely with epidural   Delivery consent: signed at 28w on 4/15  Ultrasounds:   L2 Anatomy Scan completed 24 at [facility]; anatomy normal  24 29w2d: Transverse presentation. Anterior placenta. EFW 65%. Recommend a follow-up ultrasound in 5 weeks for growth.    Assessment and Plan:  No obstetric complaints  Discussed  labor precautions and fetal kick counts  Return to clinic in 2 weeks

## 2024-05-13 ENCOUNTER — DOCUMENTATION (OUTPATIENT)
Dept: PERINATAL CARE | Facility: CLINIC | Age: 34
End: 2024-05-13

## 2024-05-13 ENCOUNTER — ROUTINE PRENATAL (OUTPATIENT)
Dept: OBGYN CLINIC | Facility: CLINIC | Age: 34
End: 2024-05-13

## 2024-05-13 VITALS
HEIGHT: 59 IN | WEIGHT: 167.2 LBS | SYSTOLIC BLOOD PRESSURE: 102 MMHG | BODY MASS INDEX: 33.71 KG/M2 | HEART RATE: 92 BPM | DIASTOLIC BLOOD PRESSURE: 67 MMHG

## 2024-05-13 DIAGNOSIS — Z34.93 PRENATAL CARE IN THIRD TRIMESTER: ICD-10-CM

## 2024-05-13 DIAGNOSIS — Z3A.32 32 WEEKS GESTATION OF PREGNANCY: Primary | ICD-10-CM

## 2024-05-13 PROCEDURE — PNV: Performed by: NURSE PRACTITIONER

## 2024-05-13 NOTE — PROGRESS NOTES
"  Date: 05/13/24  Naresh Sarabia  1990  Estimated Date of Delivery: 7/7/24  32w1d  OB/GYN:[facility]    Diagnosis:  Diet controlled GDM    Blood Sugar Logs Submitted via: Glucose Flowsheet        Assessment and Plan:  No diabetes related medications. FBS is higher on a weekend  1800 calorie (CHO: 45-15-45-15-45-30) (PRO:2-1-3-1-3-2) meal plan consisting of 3 meals and 3 snacks daily including protein at each  Advised patient to  change dinner to 2 CHO serving (30 mg ) & increase to 4 oz protein.   Avoid fasting for > 10 hours overnight  Continue SMBG 4 x per day (Fasting, 2 hour after start of each meal) with a One-Touch Verio glucose meter  Walks 20-30 minutes after dinner nightly    Lab Work:   Lab Results   Component Value Date    HGBA1C 5.3 11/29/2023      Ultrasound:       Date:4/23/24       Fetal Growth: Normal       JEN: Normal  Next: 5/17/24    Diabetes Self Management Support Plan outside of ongoing care: Spouse/Family   Patient Stated Goal: \"I will involve my family in my diabetes care\"   Goal Assessment: On track    Date to report next: Weekly     Susu Husain, MS, RD, [facility]  Diabetes Educator  [facility] Maternal Fetal Medicine  Diabetes in Pregnancy Program  [address], [address]  [place] [address]    "

## 2024-05-13 NOTE — PROGRESS NOTES
Naresh Sarabia presents today for routine OB visit at 32w1d. She is a . Pregnancy is complicated by GDM. She is following with [facility] and [facility].     Blood Pressure: 102/67    Wt=75.8 kg (167 lb 3.2 oz); Body mass index is 33.77 kg/m².; TWG=6.441 kg (14 lb 3.2 oz)  Fetal Heart Rate: 135; Fundal Height (cm): 32 cm  Abdomen: gravid, soft, non-tender.  She reports round ligament pain using a pregnancy support belt with minimal relief.   Denies uterine contractions.  Denies vaginal bleeding or leaking of fluid.  Reports adequate fetal movement of at least 10 movements in 2 hours once daily.  Scheduled for ultrasound 24.    She is controlling gestational diabetes with diet but is reporting increasing hunger. Encouraged patient to increase protein intake and continue to report glucose levels to [facility].  Reviewed premature labor precautions and fetal kick counts.  Advised to continue medications and return in 2 weeks.      Current Outpatient Medications   Medication Instructions    albuterol (PROVENTIL HFA,VENTOLIN HFA) 90 mcg/act inhaler     Aspirin 81 MG CAPS Oral    Blood Glucose Monitoring Suppl (OneTouch Verio Flex System) w/Device KIT Test 4 times daily    ferrous sulfate 324 mg, Oral, Daily before breakfast    FOLIC ACID PO Oral    Lancets (OneTouch Delica Plus [id number]) MISC Test 4 times daily    Omega-3 Fatty Acids (FISH OIL CONCENTRATE PO) Oral    OneTouch Verio test strip Test 4 times daily    Prenat MV-Min w/Fe-Folate-DHA (PRENATAL COMPLETE PO) Oral         Pregnancy Problems (from 24 to present)       Problem Noted Resolved    Diet controlled gestational diabetes mellitus (GDM) in third trimester 2024 by Evelyn Mendoza DO No    Overview Signed 2024  4:01 PM by Evelyn Mendoza DO     1 hr  - sent to [facility] for diabetic education         32 weeks gestation of pregnancy 3/31/2024 by Georgina Jasso MD No    Overview Addendum 2024 10:43 AM by  EDE Stafford     Overview:  Labs  Pap smear due; pt requests collecting postpartum. GC/CT neg  Prenatal panel collected 23; notable for:  Mild anemia, hemoglobin 11.09  Hepatitis B nonimmunity; rubella immunity  Early 1 hour GTT (2023) WNL  1 hour GTT (2023) elevated at 183  Urine culture WNL  ABO: O+, antibody negative  28w labs ordered: RPR negative, CBC shows Hgb 10.6, elevated 1 hour glucose  Hgb electrophoresis ordered   GBS to be collected @ 36 weeks   Vaccines:  Tdap vaccine: given 24  Influenza vaccine: flu vaccine season ended   Covid vaccine: received   Genetic screening  NIPT (2024): low risk  AFP declined per [facility] notes  Contraception: none, patient is in a same sex relationship   Feeding plan: breastfeeding   Birth plan:  @ [place], likely with epidural   Delivery consent: signed at 28w on 4/15  Ultrasounds:   L2 Anatomy Scan completed 24 at [facility]; anatomy normal  24 29w2d: Transverse presentation. Anterior placenta. EFW 65%. Recommend a follow-up ultrasound in 5 weeks for growth.    Assessment and Plan:  No obstetric complaints  Discussed  labor precautions and fetal kick counts  Return to clinic in 2 weeks

## 2024-05-17 ENCOUNTER — ULTRASOUND (OUTPATIENT)
Facility: HOSPITAL | Age: 34
End: 2024-05-17
Attending: OBSTETRICS & GYNECOLOGY
Payer: COMMERCIAL

## 2024-05-17 VITALS
BODY MASS INDEX: 34.03 KG/M2 | SYSTOLIC BLOOD PRESSURE: 110 MMHG | HEART RATE: 78 BPM | WEIGHT: 168.8 LBS | HEIGHT: 59 IN | DIASTOLIC BLOOD PRESSURE: 58 MMHG

## 2024-05-17 DIAGNOSIS — Z34.92 PRENATAL CARE IN SECOND TRIMESTER: ICD-10-CM

## 2024-05-17 DIAGNOSIS — O24.410 DIET CONTROLLED GESTATIONAL DIABETES MELLITUS (GDM) IN THIRD TRIMESTER: Primary | ICD-10-CM

## 2024-05-17 DIAGNOSIS — Z3A.32 32 WEEKS GESTATION OF PREGNANCY: ICD-10-CM

## 2024-05-17 PROCEDURE — 99213 OFFICE O/P EST LOW 20 MIN: CPT | Performed by: OBSTETRICS & GYNECOLOGY

## 2024-05-17 PROCEDURE — 76816 OB US FOLLOW-UP PER FETUS: CPT | Performed by: OBSTETRICS & GYNECOLOGY

## 2024-05-17 NOTE — PROGRESS NOTES
The patient was seen today for an ultrasound.  Please see ultrasound report (located under Ob Procedures) for additional details.   Thank you very much for allowing us to participate in the care of this very nice patient.  Should you have any questions, please do not hesitate to contact me.     Sherman Hayes MD FACOG  Attending Physician, Maternal-Fetal Medicine  [facility] [address]

## 2024-05-19 ENCOUNTER — CLINICAL SUPPORT (OUTPATIENT)
Age: 34
End: 2024-05-19

## 2024-05-19 DIAGNOSIS — Z32.2 ENCOUNTER FOR CHILDBIRTH INSTRUCTION: Primary | ICD-10-CM

## 2024-05-23 ENCOUNTER — PATIENT MESSAGE (OUTPATIENT)
Dept: PERINATAL CARE | Facility: CLINIC | Age: 34
End: 2024-05-23

## 2024-05-28 ENCOUNTER — ROUTINE PRENATAL (OUTPATIENT)
Dept: OBGYN CLINIC | Facility: CLINIC | Age: 34
End: 2024-05-28

## 2024-05-28 VITALS
SYSTOLIC BLOOD PRESSURE: 101 MMHG | HEART RATE: 93 BPM | BODY MASS INDEX: 33.71 KG/M2 | HEIGHT: 59 IN | RESPIRATION RATE: 18 BRPM | DIASTOLIC BLOOD PRESSURE: 69 MMHG | WEIGHT: 167.2 LBS

## 2024-05-28 DIAGNOSIS — Z3A.32 32 WEEKS GESTATION OF PREGNANCY: Primary | ICD-10-CM

## 2024-05-28 PROCEDURE — PNV: Performed by: OBSTETRICS & GYNECOLOGY

## 2024-05-28 NOTE — PROGRESS NOTES
"Slick Sarabia is a 34 y.o. female being seen today for her obstetrical visit. She is at 34w2d gestation. Patient reports  vaginal discharge since Saturday, size of a 50 cent coin, white, thin with some thickness. No particular smell. Reports abdominal pain and back pain, which occurs throughout the day . Fetal movement: normal.    Menstrual History:  OB History          1    Para        Term                AB        Living             SAB        IAB        Ectopic        Multiple        Live Births                    Menarche age:   Patient's last menstrual period was 10/01/2023.       The following portions of the patient's history were reviewed and updated as appropriate: allergies, current medications, past family history, past medical history, past social history, past surgical history, and problem list.    Review of Systems  Respiratory: negative for dyspnea on exertion  Cardiovascular: negative for chest pain and exertional chest pressure/discomfort  Gastrointestinal: positive for abdominal pain  Genitourinary:negative for VB, LOF. Positive for vaginal discharge  Musculoskeletal:negative     Objective     /69 (BP Location: Left arm, Patient Position: Sitting, Cuff Size: Large)   Pulse 93   Resp 18   Ht 4' 11\" (1.499 m)   Wt 75.8 kg (167 lb 3.2 oz)   LMP 10/01/2023   BMI 33.77 kg/m²   FHT:  156 BPM                Assessment     Pregnancy 34 and 2/7 weeks     32 weeks gestation of pregnancy  Overview:  Labs  Pap smear due; pt requests collecting postpartum. GC/CT neg  Prenatal panel collected 23; notable for:  Mild anemia, hemoglobin 11.09  Hepatitis B nonimmunity; rubella immunity  Early 1 hour GTT (2023) WNL  1 hour GTT (2023) elevated at 183  Urine culture WNL  ABO: O+, antibody negative  28w labs ordered: RPR negative, CBC shows Hgb 10.6, elevated 1 hour glucose  Hgb electrophoresis ordered   GBS to be collected @ 36 weeks   Vaccines:  Tdap " vaccine: given 24  Influenza vaccine: flu vaccine season ended   Covid vaccine: received   Genetic screening  NIPT (2024): low risk  AFP declined per [facility] notes  Contraception: none, patient is in a same sex relationship   Feeding plan: breastfeeding   Birth plan:  @ [place], likely with epidural   Delivery consent: signed at 28w on 4/15  Ultrasounds:   L2 Anatomy Scan completed 24 at [facility]; anatomy normal  24 29w2d: Transverse presentation. Anterior placenta. EFW 65%. Recommend a follow-up ultrasound in 5 weeks for growth.     Assessment and Plan:  Vaginal discharge is WNL, told to watch out for odor, itching, burning  Follow up with diabetic center for BG checks  Return to clinic in 2 weeks      Plan     28-week labs reviewed, abnormal. Follows with diabetic center, told to follow up her BG levels.    Follow up in 2 Weeks.      Elizabeth Mane, DO PGY-3  Family Medicine

## 2024-05-28 NOTE — ASSESSMENT & PLAN NOTE
Overview:  Labs  Pap smear due; pt requests collecting postpartum. GC/CT neg  Prenatal panel collected 23; notable for:  Mild anemia, hemoglobin 11.09  Hepatitis B nonimmunity; rubella immunity  Early 1 hour GTT (2023) WNL  1 hour GTT (2023) elevated at 183  Urine culture WNL  ABO: O+, antibody negative  28w labs ordered: RPR negative, CBC shows Hgb 10.6, elevated 1 hour glucose  Hgb electrophoresis ordered   GBS to be collected @ 36 weeks   Vaccines:  Tdap vaccine: given 24  Influenza vaccine: flu vaccine season ended   Covid vaccine: received   Genetic screening  NIPT (2024): low risk  AFP declined per [facility] notes  Contraception: none, patient is in a same sex relationship   Feeding plan: breastfeeding   Birth plan:  @ [place], likely with epidural   Delivery consent: signed at 28w on 4/15  Ultrasounds:   L2 Anatomy Scan completed 24 at [facility]; anatomy normal  24 29w2d: Transverse presentation. Anterior placenta. EFW 65%. Recommend a follow-up ultrasound in 5 weeks for growth.     Assessment and Plan:  Vaginal discharge is WNL, told to watch out for odor, itching, burning  Follow up with diabetic center for BG checks  Return to clinic in 2 weeks

## 2024-05-30 ENCOUNTER — DOCUMENTATION (OUTPATIENT)
Dept: PERINATAL CARE | Facility: CLINIC | Age: 34
End: 2024-05-30

## 2024-05-30 NOTE — PROGRESS NOTES
"  Date: 05/30/24  Naresh Sarabia  1990  Estimated Date of Delivery: 7/7/24  34w4d  OB/GYN:[facility]    Diagnosis:  Diet controlled GDM    Blood Sugar Logs Submitted via: Glucose Flowsheet        Assessment and Plan:  No diabetes related medications. !/2 of her FBS are increased. Question if her BG results are before a meal or after a meal. If her nighttime BG is after dinner, then her after dinner results are normal. Advised to change her Bedtime snack to 1 CHO serving (15 gm) & 2-3 protein. Examples wre provided.   1800 calorie (CHO: 45-15-45-15-45-30) (PRO:2-1-3-1-3-2) meal plan consisting of 3 meals and 3 snacks daily including protein at each  Advised patient to  change dinner to 2 CHO serving (30 mg ) & increase to 4 oz protein.   Avoid fasting for > 10 hours overnight  Continue SMBG 4 x per day (Fasting, 2 hour after start of each meal) with a One-Touch Verio glucose meter  Walks 20-30 minutes after dinner nightly    Lab Work:   Lab Results   Component Value Date    HGBA1C 5.3 11/29/2023      Ultrasound:       Date:5/17/24       Fetal Growth: Normal       JEN: Normal  Next: 6/17/24    Diabetes Self Management Support Plan outside of ongoing care: Spouse/Family   Patient Stated Goal: \"I will involve my family in my diabetes care\"   Goal Assessment: On track    Date to report next: Weekly     Susu Husain, MS, RD, [facility]  Diabetes Educator  [facility] Maternal Fetal Medicine  Diabetes in Pregnancy Program  [address], [address]  [place] [address]    "

## 2024-05-31 ENCOUNTER — TELEPHONE (OUTPATIENT)
Dept: PERINATAL CARE | Facility: CLINIC | Age: 34
End: 2024-05-31

## 2024-06-07 ENCOUNTER — DOCUMENTATION (OUTPATIENT)
Dept: PERINATAL CARE | Facility: CLINIC | Age: 34
End: 2024-06-07

## 2024-06-07 ENCOUNTER — TELEPHONE (OUTPATIENT)
Age: 34
End: 2024-06-07

## 2024-06-07 NOTE — PROGRESS NOTES
"  Date: 06/07/24  Naresh Sarabia  1990  Estimated Date of Delivery: 7/7/24  35w5d  [facility]    Diagnosis:  Diet controlled GDM    Blood Sugar Logs Submitted via: Glucose Flowsheet      Assessment and Plan:  No diabetes related medications. FBS now normal with change in bedtime snack. Had advised patient to test 2 hours after beginning to eat a meal. Patient was testing before meals when discussed on phone. .   1800 calorie (CHO: 45-15-45-15-45-30) (PRO:2-1-3-1-3-2) meal plan consisting of 3 meals and 3 snacks daily including protein at each. Has changed bedtime snack to 1 CHO serving (15 gm) & 2-3 oz protein.   Advised patient to  change dinner to 2 CHO serving (30 mg ) & increase to 4 oz protein.   Avoid fasting for > 10 hours overnight  Continue SMBG 4 x per day (Fasting, 2 hour after start of each meal) with a One-Touch Verio glucose meter  Walks 20-30 minutes after dinner nightly    Lab Work:   Lab Results   Component Value Date    HGBA1C 5.3 11/29/2023      Ultrasound:       Date:5/17/24       Fetal Growth: Normal       JEN: Normal  Next: 6/17/24    Diabetes Self Management Support Plan outside of ongoing care: Spouse/Family   Patient Stated Goal: \"I will involve my family in my diabetes care\"   Goal Assessment: On track    Date to report next: Weekly     Susu Husain, MS, RD, [facility]  Diabetes Educator  [facility]  Diabetes in Pregnancy Program  [address], [address]  [place] [address]    "

## 2024-06-07 NOTE — TELEPHONE ENCOUNTER
Patient called stating she sent her blood sugars through the amy on 6/5 or 6/6 and has not heard anything back from diabetes educators. Message routed.

## 2024-06-10 ENCOUNTER — ROUTINE PRENATAL (OUTPATIENT)
Dept: OBGYN CLINIC | Facility: CLINIC | Age: 34
End: 2024-06-10

## 2024-06-10 VITALS
HEART RATE: 90 BPM | HEIGHT: 59 IN | BODY MASS INDEX: 33.83 KG/M2 | DIASTOLIC BLOOD PRESSURE: 70 MMHG | SYSTOLIC BLOOD PRESSURE: 104 MMHG | WEIGHT: 167.8 LBS

## 2024-06-10 DIAGNOSIS — Z34.93 PRENATAL CARE IN THIRD TRIMESTER: Primary | ICD-10-CM

## 2024-06-10 DIAGNOSIS — Z3A.36 36 WEEKS GESTATION OF PREGNANCY: ICD-10-CM

## 2024-06-10 PROCEDURE — 87150 DNA/RNA AMPLIFIED PROBE: CPT | Performed by: NURSE PRACTITIONER

## 2024-06-10 PROCEDURE — PNV: Performed by: NURSE PRACTITIONER

## 2024-06-10 NOTE — PATIENT INSTRUCTIONS
LABOR PRECAUTIONS  Call our office for any of the following:    * I need to call immediately I if I have even a small amount of LIQUID  leaking from my vagina, with or without contractions.   * I need to call if I am BLEEDING an amount equal to or more than a period.  A small amount of bloody vaginal discharge is normal at the end of the pregnancy.   * I need to call if I am having CONTRACTIONS  every five minutes for at least an hour.  I will need a watch in order to time them.  I should time them from the beginning of one contraction until the beginning of the next one.   * I need to call BEFORE  I go to the hospital.   * I need to have a plan for TRANSPORTATION  to get to the hospital when I am in labor.  Labor is generally not an emergency which requires an ambulance.          FETAL KICK COUNTS    In the third trimester (after 28 weeks gestation) you should be performing fetal kick counts every day.  Your baby should move at least 10 times in 2 hours during an active time, once a day.    Choose atime of day when your baby is most active.  Try to do this around the same time each day.  Get into a comfortable position and then write down the time your baby first moves.  Count each movement until the baby moves 10 times.  These movements include kicks, punches, nudges, flutters, or rolls.  This can take anywhere from 5 minutes to 2 hours.  Write down the time you feel the baby's 10th movement.    If 2 hours has passed and your baby has not moved at least 10 times, you should CALL THE OFFICE RIGHT AWAY.     PERINEAL / VAGINAL MASSAGE    What can I do now to decrease my chances of tearing during delivery?  Massaging around the vaginal opening by you (or your partner), either antepartum (before birth) or during the second stage of labor, can reduce the likelihood of perineal tearing during childbirth.  Likewise, the use of warm packs held on the perineum during the pushing stage of labor can reduce the severity of  your tear.  This will happen during the pushing stage of labor.  At home, you can also help reduce the chances of injury that may occur during the birth of your child through perineal massage.    When should I do this?  Starting around or shortly after 34 weeks of pregnancy, you or your partner should provide 5-10 minutes of vaginal massage 1-4 times per week.    How?  Use either almond, coconut, or olive oil and water mixture on 1 or 2 fingers (depending on comfort).  Insert finger(s) 3-5cm into the vagina.  Apply sweeping downward/sideward pressure from 3 to 9 o'clock for 5-10 minutes, 1-4 times per week.        GROUP B STREP    Group B Strep (GBS) is a common vaginal bacteria.  Approximately 25% of women normally have GBS bacteria present in the vagina.  It is NOT a sexually-transmitted infection.  In fact, it is not an infection AT ALL!  It is just a normal vaginal bacteria for many women.    However, the GBS bacteria can be dangerous to a  baby if the baby is exposed to that particular bacteria during labor and birth AND develops an infection from it.  The likelihood of a  GBS infection for a woman who has GBS bacteria in the vagina is about 1%-2%.  But if it does occur, a baby could become severely ill.    For this reason, we do a vaginal culture (Q-tip swab of the vagina and rectum) for ALL pregnant women at approximately 36 weeks of pregnancy.  If the culture shows that there is GBS bacteria present, it is NOT a reason to panic!  Because in this situation we will give this woman antibiotics through her IV while she is in labor.  When a mother is treated with antibiotics during labor and delivery, her baby ALMOST NEVER becomes infected with GBS bacteria.

## 2024-06-10 NOTE — PROGRESS NOTES
Naresh Sarabia presents today for routine OB visit at 36w1d. She is a .     Blood Pressure: 104/70  Wt=76.1 kg (167 lb 12.8 oz); Body mass index is 33.89 kg/m².; TWG=6.713 kg (14 lb 12.8 oz)  Fetal Heart Rate: 145; Fundal Height (cm): 36 cm  Abdomen: gravid, soft, non-tender.  She reports no complaints.  Denies uterine contractions.  Denies vaginal bleeding or leaking of fluid.  Reports adequate fetal movement of at least 10 movements in 2 hours once daily.  Scheduled for ultrasound 24.    GBS culture collected.   Discussed perineal massage to decrease risk of tearing.   Reviewed premature labor precautions and fetal kick counts.  Advised to continue medications and return in 1 weeks.      Current Outpatient Medications   Medication Instructions    albuterol (PROVENTIL HFA,VENTOLIN HFA) 90 mcg/act inhaler     Aspirin 81 MG CAPS Take by mouth    Blood Glucose Monitoring Suppl (OneTouch Verio Flex System) w/Device KIT Test 4 times daily    ferrous sulfate 324 mg, Oral, Daily before breakfast    FOLIC ACID PO Oral    Lancets (OneTouch Delica Plus [id number]) MISC Test 4 times daily    Omega-3 Fatty Acids (FISH OIL CONCENTRATE PO) Oral    OneTouch Verio test strip Test 4 times daily    Prenat MV-Min w/Fe-Folate-DHA (PRENATAL COMPLETE PO) Oral         Pregnancy Problems (from 24 to present)       Problem Noted Resolved    Diet controlled gestational diabetes mellitus (GDM) in third trimester 2024 by Evelyn Mendoza DO No    Overview Signed 2024  4:01 PM by Evelyn Mendoza DO     1 hr  - sent to [facility] for diabetic education         36 weeks gestation of pregnancy 3/31/2024 by Georgina Jasso MD No    Overview Addendum 6/10/2024 11:00 AM by EDE Stafford     Overview:  Labs  Pap smear due; pt requests collecting postpartum. GC/CT neg  Prenatal panel collected 23; notable for:  Mild anemia, hemoglobin 11.09  Hepatitis B nonimmunity; rubella immunity  Early 1 hour GTT  (2023) WNL  1 hour GTT (2023) elevated at 183  Urine culture WNL  ABO: O+, antibody negative  28w labs ordered: RPR negative, CBC shows Hgb 10.6, elevated 1 hour glucose  Hgb electrophoresis ordered   GBS collected 6/10/24  Vaccines:  Tdap vaccine: given 24  Influenza vaccine: flu vaccine season ended   Covid vaccine: received   Genetic screening  NIPT (2024): low risk  AFP declined per [facility] notes  Contraception: none, patient is in a same sex relationship   Feeding plan: breastfeeding   Birth plan:  @ [place], likely with epidural   Delivery consent: signed at 28w on 4/15  Ultrasounds:   L2 Anatomy Scan completed 24 at [facility]; anatomy normal  24 29w2d: Transverse presentation. Anterior placenta. EFW 65%.   24 32w5d: Vertex. Anterior placenta. EFW 72%. Return in 4 weeks for a growth ultrasound secondary to A1 GDM    Assessment and Plan:  No obstetric complaints  Discussed  labor precautions and fetal kick counts  Return to clinic in 1 weeks

## 2024-06-12 ENCOUNTER — HOSPITAL ENCOUNTER (OUTPATIENT)
Facility: HOSPITAL | Age: 34
Discharge: HOME | End: 2024-06-12
Attending: OBSTETRICS & GYNECOLOGY | Admitting: OBSTETRICS & GYNECOLOGY
Payer: COMMERCIAL

## 2024-06-12 ENCOUNTER — TELEPHONE (OUTPATIENT)
Dept: OBGYN CLINIC | Facility: CLINIC | Age: 34
End: 2024-06-12

## 2024-06-12 VITALS
HEART RATE: 81 BPM | RESPIRATION RATE: 20 BRPM | DIASTOLIC BLOOD PRESSURE: 68 MMHG | SYSTOLIC BLOOD PRESSURE: 125 MMHG | TEMPERATURE: 99.2 F

## 2024-06-12 PROBLEM — O47.9 UTERINE CONTRACTIONS DURING PREGNANCY: Status: ACTIVE | Noted: 2024-06-12

## 2024-06-12 LAB
BACTERIA UR QL AUTO: ABNORMAL /HPF
BILIRUB UR QL STRIP: NEGATIVE
CLARITY UR: CLEAR
COLOR UR: YELLOW
GLUCOSE SERPL-MCNC: 109 MG/DL (ref 65–140)
GLUCOSE UR STRIP-MCNC: NEGATIVE MG/DL
GP B STREP DNA SPEC QL NAA+PROBE: POSITIVE
HGB UR QL STRIP.AUTO: ABNORMAL
KETONES UR STRIP-MCNC: NEGATIVE MG/DL
LEUKOCYTE ESTERASE UR QL STRIP: ABNORMAL
MUCOUS THREADS UR QL AUTO: ABNORMAL
NITRITE UR QL STRIP: NEGATIVE
NON-SQ EPI CELLS URNS QL MICRO: ABNORMAL /HPF
PH UR STRIP.AUTO: 5.5 [PH] (ref 4.5–8)
PROT UR STRIP-MCNC: NEGATIVE MG/DL
RBC #/AREA URNS AUTO: ABNORMAL /HPF
SP GR UR STRIP.AUTO: 1.02 (ref 1–1.03)
UROBILINOGEN UR QL STRIP.AUTO: 0.2 E.U./DL
WBC #/AREA URNS AUTO: ABNORMAL /HPF

## 2024-06-12 PROCEDURE — NC001 PR NO CHARGE: Performed by: OBSTETRICS & GYNECOLOGY

## 2024-06-12 PROCEDURE — 81001 URINALYSIS AUTO W/SCOPE: CPT

## 2024-06-12 PROCEDURE — 99214 OFFICE O/P EST MOD 30 MIN: CPT

## 2024-06-12 PROCEDURE — 82948 REAGENT STRIP/BLOOD GLUCOSE: CPT

## 2024-06-12 PROCEDURE — 76815 OB US LIMITED FETUS(S): CPT

## 2024-06-12 NOTE — LETTER
[facility]  [address] [address]  Dept: [phone]    June 12, 2024     Patient: Naresh Sarabia   YOB: 1990   Date of Visit: 6/12/2024       To Whom it May Concern:    Naresh Sarabia is under my professional care. She was seen in the hospital from 6/12/2024 to 06/12/24. Her partner, Vimal Gibson, was in the hospital with her during this time.     If you have any questions or concerns, please don't hesitate to call.         Sincerely,          Georgina Estrada MD

## 2024-06-12 NOTE — PROGRESS NOTES
L&D Triage Note - OB/GYN  Naresh Sarabia 34 y.o. female MRN: [id number]  Unit/Bed#:  TRIAGE 3-01 Encounter: [id number]      ASSESSMENT:    Naresh Sarabia is a 34 y.o.  at 36w3d who presents with increased vaginal discharge and contractions. Amniotic sac not ruptured and patient not in active labor. Discussed [place] contractions versus early labor. Also discussed elective induction at 39 weeks. Patient to discuss at ob prenatal visit.     PLAN:    1) vaginal discharge  - microscopy negative  - JEN 15.99cm  - pooling, ferning, nitrazene negative     2) contractions  FHT reactive  Kamaili: contractions q4min  SVE 0/0/-5    3) Continue routine prenatal care  4) Discharge from OB triage with  labor precautions    - Reviewed rupture of membranes, false vs true labor, decreased fetal movement, and vaginal bleeding   - Pt to call provider with any concerns and follow up at her next scheduled prenatal appointment 2024   - Case discussed with Dr. Lopez    SUBJECTIVE:    Naresh Sarabia 34 y.o.  at 36w3d with an Estimated Date of Delivery: 24 who presents with increased discharge and contractions. She reports increase in discharge for the past 2 days. It is now more watery and in greater volume. She also reports contractions starting today. They were very strong earlier today, but now not as strong. They were every 15 minutes, however now she is not able to determine the frequency due to active fetal movement. She denies vaginal bleeding.    Her current obstetrical history is significant for A1GDM, asthma    OBJECTIVE:    Vitals:    24 1547   BP: 125/68   Pulse: 81   Resp:    Temp:        ROS:  Constitutional: Negative for fevers, chills, headaches, vision changes  Respiratory: Negative for shortness of breath, cough  Cardiovascular: Negative for chest pain, palpitations, lower extremity edema  Gastrointestinal: Negative for nausea, vomiting, diarrhea, constipation  :   Negative for dysuria, hematuria  EXTR:  Negative for rash, new myalgias/arthralgias, joint swelling    General Physical Exam:  General: in no apparent distress and well developed and well nourished  Cardiovascular: Cor RRR  Lungs: non-labored breathing  Abdomen: abdomen is soft without significant tenderness, masses, organomegaly or guarding  Lower extremeties: nontender    Cervical Exam  Speculum: Cervical os is not visibly dilated, no bleeding, lesions, or pooling, no discharge  SVE: 0 / 0% / -5    Fetal monitoring:  FHT:  130 bpm/ Moderate 6 - 25 bpm / 15 x 15 accelerations present, no decelerations  [place]: contractions q4min     KOH/WTMT:     Infection:   - No clue cells    - No hyphae   - No trichomonads present    Membrane status   - No ferning   - Negative nitrazene   - No pooling     Urine Dip    - positive for small leuks, trace blood    Imaging:      Abd. US   JEN      - Q1 7.31cm     - Q2 3.02cm     - Q3 4.48cm     - Q4 1.18cm     - Total: 15.99cm   Placenta: anterior   Presentation: cephalic    Georgina Estrada MD,  OBGYN PGY-2  6/12/2024 4:15 PM

## 2024-06-12 NOTE — PROCEDURES
Naresh Sarabia, a  at 36w3d with an GIL of 2024, by Last Menstrual Period, was seen at [facility] for the following procedure(s): $Procedure Type: JEN]         4 Quadrant JEN  JEN Q1 (cm): 7.3 cm  JEN Q2 (cm): 3 cm  JEN Q3 (cm): 4.5 cm  JEN Q4 (cm): 1.2 cm  JEN TOTAL (cm): 16 cm                 Georgina Estrada MD  Obstetrics & Gynecology, PGY-2

## 2024-06-12 NOTE — LETTER
[facility]  [address] [address]  Dept: [phone]    June 12, 2024     Patient: Naresh Sarabia   YOB: 1990   Date of Visit: 6/12/2024       To Whom it May Concern:    Naresh Sarabia is under my professional care. She was seen in the hospital from 6/12/2024 to 06/12/24.     If you have any questions or concerns, please don't hesitate to call.         Sincerely,          Georgina Estrada MD

## 2024-06-12 NOTE — TELEPHONE ENCOUNTER
Patient called office with concerns of watery type vaginal discharge for the past two days as well as contractions she believes are happening every 15 mins. Denied any vaginal bleeding advised to present to [facility] to be evaluated. Patient and partner stated understanding and were agreeable to plan.

## 2024-06-17 ENCOUNTER — ULTRASOUND (OUTPATIENT)
Dept: PERINATAL CARE | Facility: OTHER | Age: 34
End: 2024-06-17
Payer: COMMERCIAL

## 2024-06-17 VITALS
BODY MASS INDEX: 34.64 KG/M2 | HEIGHT: 59 IN | DIASTOLIC BLOOD PRESSURE: 72 MMHG | SYSTOLIC BLOOD PRESSURE: 100 MMHG | WEIGHT: 171.8 LBS | HEART RATE: 95 BPM

## 2024-06-17 DIAGNOSIS — Z3A.37 37 WEEKS GESTATION OF PREGNANCY: Primary | ICD-10-CM

## 2024-06-17 DIAGNOSIS — Z36.89 ENCOUNTER FOR ULTRASOUND TO CHECK FETAL GROWTH: ICD-10-CM

## 2024-06-17 DIAGNOSIS — O24.410 DIET CONTROLLED GESTATIONAL DIABETES MELLITUS (GDM) IN THIRD TRIMESTER: ICD-10-CM

## 2024-06-17 PROCEDURE — 76816 OB US FOLLOW-UP PER FETUS: CPT | Performed by: STUDENT IN AN ORGANIZED HEALTH CARE EDUCATION/TRAINING PROGRAM

## 2024-06-17 PROCEDURE — 99213 OFFICE O/P EST LOW 20 MIN: CPT | Performed by: STUDENT IN AN ORGANIZED HEALTH CARE EDUCATION/TRAINING PROGRAM

## 2024-06-17 NOTE — PROGRESS NOTES
"[facility]: Ms. Sarabia was seen today for fetal growth assessment ultrasound.  See ultrasound report under \"OB Procedures\" tab.      MDM:   I. Diagnoses/Problems addressed:  Acute uncomplicated illness/injury: GDMA1 (ex. GDMA1, MO, UTI)  II.  Data:  glucose log, prenatal note 6/10  III.  Risk of morbidity: Low    Please don't hesitate to contact our office with any concerns or questions.  -Rocio Dias MD    "

## 2024-06-19 ENCOUNTER — ROUTINE PRENATAL (OUTPATIENT)
Dept: OBGYN CLINIC | Facility: CLINIC | Age: 34
End: 2024-06-19

## 2024-06-19 VITALS
DIASTOLIC BLOOD PRESSURE: 77 MMHG | SYSTOLIC BLOOD PRESSURE: 119 MMHG | HEIGHT: 59 IN | HEART RATE: 106 BPM | BODY MASS INDEX: 34.31 KG/M2 | WEIGHT: 170.2 LBS

## 2024-06-19 DIAGNOSIS — Z34.93 PRENATAL CARE IN THIRD TRIMESTER: ICD-10-CM

## 2024-06-19 DIAGNOSIS — Z3A.37 37 WEEKS GESTATION OF PREGNANCY: Primary | ICD-10-CM

## 2024-06-19 PROCEDURE — PNV: Performed by: NURSE PRACTITIONER

## 2024-06-19 NOTE — PROGRESS NOTES
Naresh Sarabia presents today for routine OB visit at 37w3d. She is a . Pregnancy is complicated by GDM, she is following with [facility] and the Diabetic Educators.     Blood Pressure: 119/77  Wt=77.2 kg (170 lb 3.2 oz); Body mass index is 34.38 kg/m².; TWG=7.802 kg (17 lb 3.2 oz)  Fetal Heart Rate: 140; Fundal Height (cm): 38 cm  Abdomen: gravid, soft, non-tender.  She reports no complaints.  Denies uterine contractions.  Denies vaginal bleeding or leaking of fluid.  Reports adequate fetal movement of at least 10 movements in 2 hours once daily.    She is considering elective IOL at 39 weeks due to fetal size (EFW 84%). We discussed IOL procedure. She will consider this option over the next week and discuss further questions/proceed with scheduling at next visit.   Reviewed labor precautions and fetal kick counts as well as pre-eclampsia warning signs.  Reviewed perineal massage for decreasing risk of perineal lacerations during delivery.  Advised to continue medications and return in 1 week.      Current Outpatient Medications   Medication Instructions    albuterol (PROVENTIL HFA,VENTOLIN HFA) 90 mcg/act inhaler     Aspirin 81 MG CAPS Take by mouth    Blood Glucose Monitoring Suppl (OneTouch Verio Flex System) w/Device KIT Test 4 times daily    ferrous sulfate 324 mg, Oral, Daily before breakfast    FOLIC ACID PO Oral    Lancets (OneTouch Delica Plus [id number]) MISC Test 4 times daily    Omega-3 Fatty Acids (FISH OIL CONCENTRATE PO) Oral    OneTouch Verio test strip Test 4 times daily    Prenat MV-Min w/Fe-Folate-DHA (PRENATAL COMPLETE PO) Oral         Pregnancy Problems (from 24 to present)       Problem Noted Resolved    Diet controlled gestational diabetes mellitus (GDM) in third trimester 2024 by Evelyn Mendoza DO No    Overview Signed 2024  4:01 PM by Evelyn Mendoza DO     1 hr  - sent to [facility] for diabetic education         37 weeks gestation of pregnancy 3/31/2024 by Georgina LOVE  MD Romero No    Overview Addendum 2024 12:43 PM by DEE Stafford     Overview:  Labs  Pap smear due; pt requests collecting postpartum. GC/CT neg  Prenatal panel collected 23; notable for:  Mild anemia, hemoglobin 11.09  Hepatitis B nonimmunity; rubella immunity  Early 1 hour GTT (2023) WNL  1 hour GTT (2023) elevated at 183  Urine culture WNL  ABO: O+, antibody negative  28w labs ordered: RPR negative, CBC shows Hgb 10.6, elevated 1 hour glucose  Hgb electrophoresis ordered   GBS+  Vaccines:  Tdap vaccine: given 24  Influenza vaccine: flu vaccine season ended   Covid vaccine: received   Genetic screening  NIPT (2024): low risk  AFP declined per [facility] notes  Contraception: none, patient is in a same sex relationship   Feeding plan: breastfeeding   Birth plan:  @ [place], likely with epidural   Delivery consent: signed at 28w on 4/15  Ultrasounds:   L2 Anatomy Scan completed 24 at [facility]; anatomy normal  24 29w2d: Transverse presentation. Anterior placenta. EFW 65%.   24 32w5d: Vertex. Anterior placenta. EFW 72%. Return in 4 weeks for a growth ultrasound secondary to A1 GDM  24 37w1d: Vertex. Anterior placenta. EFW 84%.     Assessment and Plan:  No obstetric complaints  Discussed  labor precautions and fetal kick counts  Return to clinic in 1 weeks

## 2024-06-20 ENCOUNTER — PATIENT MESSAGE (OUTPATIENT)
Dept: PERINATAL CARE | Facility: CLINIC | Age: 34
End: 2024-06-20

## 2024-06-23 PROBLEM — Z3A.38 38 WEEKS GESTATION OF PREGNANCY: Status: ACTIVE | Noted: 2024-03-31

## 2024-06-23 NOTE — ASSESSMENT & PLAN NOTE
Lab Results   Component Value Date    HGBA1C 5.3 11/29/2023   - Following with diabetes educators  - Seen by MFM on 6/17: If undelivered by GIL, she should initiate twice-weekly NST/weekly JEN   - AC measuring >99%, discussed risks of shoulder dystocia

## 2024-06-23 NOTE — ASSESSMENT & PLAN NOTE
No obstetric complaints  Discussed labor precautions and fetal kick counts  SVE 0/0/-3  Elective induction of labor scheduled for 6/30/24 at 3PM, induction consent signed today

## 2024-06-23 NOTE — PROGRESS NOTES
OB/GYN Prenatal Visit    ASSESSMENT / PLAN:  1. 38 weeks gestation of pregnancy  Assessment & Plan:  No obstetric complaints  Discussed labor precautions and fetal kick counts  SVE 0/0/-3  Elective induction of labor scheduled for 24 at 3PM, induction consent signed today  2. Prenatal care in third trimester  3. Diet controlled gestational diabetes mellitus (GDM) in third trimester  Assessment & Plan:    Lab Results   Component Value Date    HGBA1C 5.3 2023   - Following with diabetes educators  - Seen by MFM on : If undelivered by GIL, she should initiate twice-weekly NST/weekly JEN   - AC measuring >99%, discussed risks of shoulder dystocia        SUBJECTIVE:  Naresh Sarabia is a 34 y.o.  at 38w1d here for prenatal visit.  She denies regular cramping/contractions, vaginal bleeding, loss of fluid, or decreased fetal movement. She is interested in elective induction of labor    OBJECTIVE:  Vitals:    24 0817   BP: 107/75   Pulse: 98   Resp: 18       FHT: 145 bpm  Fundal height: 38 cm  SVE: 0/0/-3    Physical Exam:    General: Well appearing, no distress  Respiratory: Unlabored breathing  Cardiovascular: Regular rate.  Abdomen: Soft, gravid, nontender  Fundal Height: Appropriate for gestational age.  Extremities: Warm and well perfused.  Non tender.      Martha Ervin MD  2024  12:56 PM

## 2024-06-24 ENCOUNTER — ROUTINE PRENATAL (OUTPATIENT)
Dept: OBGYN CLINIC | Facility: CLINIC | Age: 34
End: 2024-06-24

## 2024-06-24 VITALS
HEIGHT: 59 IN | SYSTOLIC BLOOD PRESSURE: 107 MMHG | RESPIRATION RATE: 18 BRPM | DIASTOLIC BLOOD PRESSURE: 75 MMHG | WEIGHT: 170.8 LBS | HEART RATE: 98 BPM | BODY MASS INDEX: 34.43 KG/M2

## 2024-06-24 DIAGNOSIS — O24.410 DIET CONTROLLED GESTATIONAL DIABETES MELLITUS (GDM) IN THIRD TRIMESTER: ICD-10-CM

## 2024-06-24 DIAGNOSIS — Z34.93 PRENATAL CARE IN THIRD TRIMESTER: ICD-10-CM

## 2024-06-24 DIAGNOSIS — Z3A.38 38 WEEKS GESTATION OF PREGNANCY: Primary | ICD-10-CM

## 2024-06-24 PROCEDURE — PNV: Performed by: OBSTETRICS & GYNECOLOGY

## 2024-06-26 ENCOUNTER — DOCUMENTATION (OUTPATIENT)
Dept: PERINATAL CARE | Facility: CLINIC | Age: 34
End: 2024-06-26

## 2024-06-26 NOTE — PROGRESS NOTES
"  Date: 06/26/24  Naresh Sarabia  1990  Estimated Date of Delivery: 7/7/24  38w3d  [facility]    Diagnosis:  Diet controlled GDM    Blood Sugar Logs Submitted via: Glucose Flowsheet        Assessment and Plan: Induction scheduled for 6/30/24.   No diabetes related medications.   1800 calorie (CHO: 45-15-45-15-45-30) (PRO:2-1-3-1-3-2) meal plan consisting of 3 meals and 3 snacks daily including protein at each Advised to change her Bedtime snack to 1 CHO serving (15 gm) & 2-3 protein.    Advised patient to  change dinner to 2 CHO serving (30 mg ) & increase to 4 oz protein.   Avoid fasting for > 10 hours overnight  Continue SMBG 4 x per day (Fasting, 2 hour after start of each meal) with a One-Touch Verio glucose meter  Walks 20-30 minutes after dinner nightly    Lab Work:   Lab Results   Component Value Date    HGBA1C 5.3 11/29/2023      Ultrasound:       Date:5/17/24       Fetal Growth:  AC->99% & EFW-84%       JEN: Normal  Next: none    Diabetes Self Management Support Plan outside of ongoing care: Spouse/Family   Patient Stated Goal: \"I will involve my family in my diabetes care\"   Goal Assessment: On track    Date to report next: none    Susu Husain, MS, RD, [facility]  Diabetes Educator  [facility]aternal [facility]etal [facility]  Diabetes in Pregnancy Program  [address], [address]  [place] [address]    "

## 2024-06-30 ENCOUNTER — HOSPITAL ENCOUNTER (INPATIENT)
Facility: HOSPITAL | Age: 34
LOS: 3 days | Discharge: HOME | End: 2024-07-03
Attending: OBSTETRICS & GYNECOLOGY | Admitting: OBSTETRICS & GYNECOLOGY
Payer: COMMERCIAL

## 2024-06-30 ENCOUNTER — HOSPITAL ENCOUNTER (OUTPATIENT)
Dept: LABOR AND DELIVERY | Facility: HOSPITAL | Age: 34
Discharge: HOME | End: 2024-06-30
Payer: COMMERCIAL

## 2024-06-30 DIAGNOSIS — Z3A.39 39 WEEKS GESTATION OF PREGNANCY: ICD-10-CM

## 2024-06-30 DIAGNOSIS — Z98.891 S/P PRIMARY LOW TRANSVERSE C-SECTION: Primary | ICD-10-CM

## 2024-06-30 PROBLEM — Z34.90 ENCOUNTER FOR PLANNED INDUCTION OF LABOR: Status: ACTIVE | Noted: 2024-06-30

## 2024-06-30 LAB
ABO GROUP BLD: NORMAL
ABO GROUP BLD: NORMAL
BLD GP AB SCN SERPL QL: NEGATIVE
ERYTHROCYTE [DISTWIDTH] IN BLOOD BY AUTOMATED COUNT: 14 % (ref 11.6–15.1)
GLUCOSE SERPL-MCNC: 78 MG/DL (ref 65–140)
HCT VFR BLD AUTO: 33.3 % (ref 34.8–46.1)
HGB BLD-MCNC: 11.4 G/DL (ref 11.5–15.4)
HOLD SPECIMEN: NORMAL
MCH RBC QN AUTO: 32 PG (ref 26.8–34.3)
MCHC RBC AUTO-ENTMCNC: 34.2 G/DL (ref 31.4–37.4)
MCV RBC AUTO: 94 FL (ref 82–98)
PLATELET # BLD AUTO: 252 THOUSANDS/UL (ref 149–390)
PMV BLD AUTO: 10.7 FL (ref 8.9–12.7)
RBC # BLD AUTO: 3.56 MILLION/UL (ref 3.81–5.12)
RH BLD: POSITIVE
RH BLD: POSITIVE
SPECIMEN EXPIRATION DATE: NORMAL
WBC # BLD AUTO: 10.73 THOUSAND/UL (ref 4.31–10.16)

## 2024-06-30 PROCEDURE — 86850 RBC ANTIBODY SCREEN: CPT

## 2024-06-30 PROCEDURE — 86780 TREPONEMA PALLIDUM: CPT

## 2024-06-30 PROCEDURE — 4A1HXCZ MONITORING OF PRODUCTS OF CONCEPTION, CARDIAC RATE, EXTERNAL APPROACH: ICD-10-PCS | Performed by: OBSTETRICS & GYNECOLOGY

## 2024-06-30 PROCEDURE — 85027 COMPLETE CBC AUTOMATED: CPT

## 2024-06-30 PROCEDURE — 86900 BLOOD TYPING SEROLOGIC ABO: CPT

## 2024-06-30 PROCEDURE — 82948 REAGENT STRIP/BLOOD GLUCOSE: CPT

## 2024-06-30 PROCEDURE — 86901 BLOOD TYPING SEROLOGIC RH(D): CPT

## 2024-06-30 PROCEDURE — 3E0P7VZ INTRODUCTION OF HORMONE INTO FEMALE REPRODUCTIVE, VIA NATURAL OR ARTIFICIAL OPENING: ICD-10-PCS | Performed by: OBSTETRICS & GYNECOLOGY

## 2024-06-30 PROCEDURE — 4A1HXFZ MONITORING OF PRODUCTS OF CONCEPTION, CARDIAC RHYTHM, EXTERNAL APPROACH: ICD-10-PCS | Performed by: OBSTETRICS & GYNECOLOGY

## 2024-06-30 PROCEDURE — NC001 PR NO CHARGE: Performed by: OBSTETRICS & GYNECOLOGY

## 2024-06-30 RX ORDER — SODIUM CHLORIDE, SODIUM LACTATE, POTASSIUM CHLORIDE, CALCIUM CHLORIDE 600; 310; 30; 20 MG/100ML; MG/100ML; MG/100ML; MG/100ML
125 INJECTION, SOLUTION INTRAVENOUS CONTINUOUS
Status: DISCONTINUED | OUTPATIENT
Start: 2024-06-30 | End: 2024-07-02

## 2024-06-30 RX ORDER — ALBUTEROL SULFATE 90 UG/1
1 AEROSOL, METERED RESPIRATORY (INHALATION) EVERY 4 HOURS PRN
Status: DISCONTINUED | OUTPATIENT
Start: 2024-06-30 | End: 2024-07-03 | Stop reason: HOSPADM

## 2024-06-30 RX ORDER — BUPIVACAINE HYDROCHLORIDE 2.5 MG/ML
30 INJECTION, SOLUTION EPIDURAL; INFILTRATION; INTRACAUDAL ONCE AS NEEDED
Status: DISCONTINUED | OUTPATIENT
Start: 2024-06-30 | End: 2024-07-02

## 2024-06-30 RX ORDER — ONDANSETRON 2 MG/ML
4 INJECTION INTRAMUSCULAR; INTRAVENOUS EVERY 6 HOURS PRN
Status: DISCONTINUED | OUTPATIENT
Start: 2024-06-30 | End: 2024-07-02

## 2024-06-30 RX ADMIN — Medication 50 MCG: at 19:24

## 2024-06-30 RX ADMIN — MORPHINE SULFATE 2 MG: 2 INJECTION, SOLUTION INTRAMUSCULAR; INTRAVENOUS at 23:33

## 2024-06-30 RX ADMIN — Medication 5 MILLION UNITS: at 22:30

## 2024-06-30 RX ADMIN — SODIUM CHLORIDE, SODIUM LACTATE, POTASSIUM CHLORIDE, AND CALCIUM CHLORIDE 125 ML/HR: .6; .31; .03; .02 INJECTION, SOLUTION INTRAVENOUS at 22:28

## 2024-06-30 NOTE — H&P
"H & P- Obstetrics   Naresh Sarabia 34 y.o. female MRN: [id number]  Unit/Bed#: -01 Encounter: [id number]    Assessment: 34 y.o.  at 39w0d admitted for IOL.  SVE: 0/0/-3  FHT: 130 bpm, moderate variability, 15 x 15 accelerations, no decelerations   Clinical EFW: 7 lb 9 oz @ 37 weeks ; Cephalic confirmed by TAUS  GBS status: positive    Postpartum contraception plan: same sex couple    Plan:   39 weeks gestation of pregnancy  Assessment & Plan  [facility]   Clear liquid diet   F/u T&S, CBC, RPR   IVF LR 125cc/hr   Continuous fetal monitoring and tocometry   Analgesia at maternal request   Vertex by TAUS  Induction plan FB, cytotec       Diet controlled gestational diabetes mellitus (GDM) in third trimester  Assessment & Plan  Lab Results   Component Value Date    HGBA1C 5.3 2023       No results for input(s): \"POCGLU\" in the last 72 hours.    Blood Sugar Average: Last 72 hrs:    Patient reports good sugar control at home with diet  Most recent fasting sugar this morning was 90  Check POC glucose per protocol. Insulin gtt if blood sugar >110    Nonimmune to hepatitis B virus  Assessment & Plan  Offer vaccination post partum     Asthma  Assessment & Plan  Albuterol as needed  Avoid hemabate          Discussed case and plan w/ Dr. Cota       Chief Complaint: I'm here for my induction     HPI: Naresh Sarabia is a 34 y.o.  with an GIL of 2024, by Last Menstrual Period at 39w0d who is being admitted for IOL. She complains of irregular uterine contractions, has no LOF, and reports no VB. She states she has felt good FM.    We discussed the risks of shoulder dystocia, including fetal asphyxia and injury, brachial plexus injury, clavicular fracture, humerus fracture, as well as death. We also discussed risks to mom, including hemorrhage and third and fourth degree lacerations.     Patient Active Problem List   Diagnosis    Asthma    39 weeks gestation of pregnancy    Nonimmune to " hepatitis B virus    Diet controlled gestational diabetes mellitus (GDM) in third trimester    COVID-19 affecting pregnancy in first trimester    Encounter for planned induction of labor       Baby complications/comments: none    Review of Systems   Constitutional:  Negative for chills and fever.   HENT:  Negative for congestion, rhinorrhea, sneezing and sore throat.    Eyes:  Negative for photophobia and visual disturbance.   Respiratory:  Negative for cough and shortness of breath.    Cardiovascular:  Negative for chest pain.   Gastrointestinal:  Negative for diarrhea, nausea and vomiting.   Genitourinary:  Negative for dysuria and frequency.   Neurological:  Negative for dizziness, numbness and headaches.   Psychiatric/Behavioral: Negative.         OB Hx:  OB History    Para Term  AB Living   1             SAB IAB Ectopic Multiple Live Births                  # Outcome Date GA Lbr Zaki/2nd Weight Sex Type Anes PTL Lv   1 Current                Past Medical Hx:  Past Medical History:   Diagnosis Date    Anemia     Asthma     Diabetes mellitus (HCC)     gestational diabetes diet controlled    Varicella     immunization       Past Surgical hx:  Past Surgical History:   Procedure Laterality Date    APPENDECTOMY      LASIK Bilateral        Social Hx:  Alcohol use: denies  Tobacco use: denies  Other substance use: denies    Allergies   Allergen Reactions    Other Rash and Wheezing     seasonal         Medications Prior to Admission:     albuterol (PROVENTIL HFA,VENTOLIN HFA) 90 mcg/act inhaler    Blood Glucose Monitoring Suppl (OneTouch Verio Flex System) w/Device KIT    ferrous sulfate 324 (65 Fe) mg    FOLIC ACID PO    Lancets (OneTouch Delica Plus [id number]) MISC    Omega-3 Fatty Acids (FISH OIL CONCENTRATE PO)    OneTouch Verio test strip    Prenat MV-Min w/Fe-Folate-DHA (PRENATAL COMPLETE PO)    Aspirin 81 MG CAPS    Objective:  Temp:  [98 °F (36.7 °C)] 98 °F (36.7 °C)  HR:  [84] 84  Resp:  [18]  18  BP: (111)/(68) 111/68  There is no height or weight on file to calculate BMI.     Physical Exam:  Physical Exam  Constitutional:       General: She is not in acute distress.     Appearance: Normal appearance. She is not ill-appearing.   Genitourinary:      Genitourinary Comments: SVE as above   HENT:      Head: Normocephalic and atraumatic.      Mouth/Throat:      Mouth: Mucous membranes are moist. No oral lesions.   Eyes:      General: No scleral icterus.     Extraocular Movements: Extraocular movements intact.   Cardiovascular:      Rate and Rhythm: Normal rate and regular rhythm.      Pulses: Normal pulses.   Pulmonary:      Effort: Pulmonary effort is normal. No respiratory distress.   Abdominal:      Tenderness: There is no abdominal tenderness.      Comments: Gravid   Musculoskeletal:      Right lower leg: No edema.      Left lower leg: No edema.   Neurological:      General: No focal deficit present.      Mental Status: She is alert.   Skin:     General: Skin is warm and dry.   Psychiatric:         Attention and Perception: Attention normal.         Mood and Affect: Mood normal.         Speech: Speech normal.         Behavior: Behavior normal.         Thought Content: Thought content normal.         Judgment: Judgment normal.   Vitals and nursing note reviewed. Exam conducted with a chaperone present.            FHT:  130 bpm normal baseline, moderate variability, 15 x 15 accelerations, no decelerations     TOCO:   Irritability     Lab Results   Component Value Date    WBC 10.73 (H) 06/30/2024    HGB 11.4 (L) 06/30/2024    HCT 33.3 (L) 06/30/2024     06/30/2024     Lab Results   Component Value Date    K 3.7 11/29/2023     11/29/2023    CO2 23 11/29/2023    BUN 7 11/29/2023    CREATININE 0.58 11/29/2023    AST 13 11/29/2023    ALT 15 11/29/2023     Prenatal Labs: Reviewed      Blood type: O pos  Antibody: neg  GBS: pos  HIV: NR  Rubella: immune  Syphilis IgM/IgG: NR  HBsAg: NR  HCAb:  NR  Chlamydia: neg  Gonorrhea: neg  Diabetes 1 hour screen: 183  3 hour glucose: not completed   Platelets: 252  Hgb: 11.4  >2 Midnights  INPATIENT     Signature/Title: Priya Ochoa MD  Date: 6/30/2024  Time: 6:44 PM

## 2024-06-30 NOTE — OB LABOR/OXYTOCIN SAFETY PROGRESS
Labor Progress Note - Naresh Sarabia 34 y.o. female MRN: [id number]    Unit/Bed#: -01 Encounter: [id number]                Cervical Dilation: Closed        Cervical Effacement: 0  Fetal Station: -3     Fetal Heart Rate (FHT): 153 BPM  FHR Category: I               Vital Signs:   Vitals:    06/30/24 1626   BP: 111/68   Pulse: 84   Resp: 18   Temp: 98 °F (36.7 °C)       Notes/comments:   PROCEDURE:  BRAGG BALLOON PLACEMENT    A 24F bragg with a 30cc balloon was selected. SVE was performed and cervix was located. Bragg was introduced over sterile gloved hands with the guidance of stilette. Balloon advanced through cervix beyond the internal cervical os. A small amount amount of sterile saline solution was instilled in the balloon to confirm placement. Placement was confirmed to be beyond the internal cervical os. A total of 60cc of sterile saline solution was placed into the balloon. Pt tolerated well. Instructions left with RN to place bragg to gravity with a 1L bag of IV fluid. Notify MD when bragg dislodged.      Priya Ochoa MD 6/30/2024 6:37 PM

## 2024-06-30 NOTE — ASSESSMENT & PLAN NOTE
, Hgb 11.4 --> 10.6  Sun removed, passed void trial  Pain: Tylenol and toradol scheduled, citlali 5/10 PRN    FEN: Tolerating regular diet  DVT ppx: SCDs and Lovenox 40mg qD  Passing flatus   Incision C/D/I

## 2024-07-01 ENCOUNTER — ANESTHESIA (INPATIENT)
Dept: LABOR AND DELIVERY | Facility: HOSPITAL | Age: 34
End: 2024-07-01
Payer: COMMERCIAL

## 2024-07-01 ENCOUNTER — ANESTHESIA EVENT (INPATIENT)
Dept: LABOR AND DELIVERY | Facility: HOSPITAL | Age: 34
End: 2024-07-01
Payer: COMMERCIAL

## 2024-07-01 LAB
BASE EXCESS BLDCOV CALC-SCNC: -4.1 MMOL/L (ref 1–9)
GLUCOSE SERPL-MCNC: 100 MG/DL (ref 65–140)
GLUCOSE SERPL-MCNC: 78 MG/DL (ref 65–140)
GLUCOSE SERPL-MCNC: 85 MG/DL (ref 65–140)
GLUCOSE SERPL-MCNC: 85 MG/DL (ref 65–140)
GLUCOSE SERPL-MCNC: 86 MG/DL (ref 65–140)
GLUCOSE SERPL-MCNC: 98 MG/DL (ref 65–140)
HCO3 BLDCOV-SCNC: 20.2 MMOL/L (ref 12.2–28.6)
OXYHGB MFR BLDCOV: 64.9 %
PCO2 BLDCOV: 34.9 MM HG (ref 27–43)
PH BLDCOV: 7.38 [PH] (ref 7.19–7.49)
PO2 BLDCOV: 27.3 MM HG (ref 15–45)
SAO2 % BLDCOV: 14.2 ML/DL
TREPONEMA PALLIDUM IGG+IGM AB [PRESENCE] IN SERUM OR PLASMA BY IMMUNOASSAY: NORMAL

## 2024-07-01 PROCEDURE — 82948 REAGENT STRIP/BLOOD GLUCOSE: CPT

## 2024-07-01 PROCEDURE — 3E033VJ INTRODUCTION OF OTHER HORMONE INTO PERIPHERAL VEIN, PERCUTANEOUS APPROACH: ICD-10-PCS | Performed by: OBSTETRICS & GYNECOLOGY

## 2024-07-01 PROCEDURE — 82805 BLOOD GASES W/O2 SATURATION: CPT | Performed by: OBSTETRICS & GYNECOLOGY

## 2024-07-01 PROCEDURE — 10H07YZ INSERTION OF OTHER DEVICE INTO PRODUCTS OF CONCEPTION, VIA NATURAL OR ARTIFICIAL OPENING: ICD-10-PCS | Performed by: OBSTETRICS & GYNECOLOGY

## 2024-07-01 RX ORDER — DEXAMETHASONE SODIUM PHOSPHATE 10 MG/ML
INJECTION, SOLUTION INTRAMUSCULAR; INTRAVENOUS AS NEEDED
Status: DISCONTINUED | OUTPATIENT
Start: 2024-07-01 | End: 2024-07-01

## 2024-07-01 RX ORDER — BUPIVACAINE HYDROCHLORIDE 2.5 MG/ML
INJECTION, SOLUTION EPIDURAL; INFILTRATION; INTRACAUDAL AS NEEDED
Status: DISCONTINUED | OUTPATIENT
Start: 2024-07-01 | End: 2024-07-01

## 2024-07-01 RX ORDER — NALOXONE HYDROCHLORIDE 0.4 MG/ML
0.1 INJECTION, SOLUTION INTRAMUSCULAR; INTRAVENOUS; SUBCUTANEOUS
Status: ACTIVE | OUTPATIENT
Start: 2024-07-01 | End: 2024-07-02

## 2024-07-01 RX ORDER — LIDOCAINE HYDROCHLORIDE 20 MG/ML
INJECTION, SOLUTION EPIDURAL; INFILTRATION; INTRACAUDAL; PERINEURAL AS NEEDED
Status: DISCONTINUED | OUTPATIENT
Start: 2024-07-01 | End: 2024-07-01

## 2024-07-01 RX ORDER — METOCLOPRAMIDE HYDROCHLORIDE 5 MG/ML
INJECTION INTRAMUSCULAR; INTRAVENOUS AS NEEDED
Status: DISCONTINUED | OUTPATIENT
Start: 2024-07-01 | End: 2024-07-01

## 2024-07-01 RX ORDER — SODIUM CHLORIDE, SODIUM LACTATE, POTASSIUM CHLORIDE, CALCIUM CHLORIDE 600; 310; 30; 20 MG/100ML; MG/100ML; MG/100ML; MG/100ML
INJECTION, SOLUTION INTRAVENOUS CONTINUOUS PRN
Status: DISCONTINUED | OUTPATIENT
Start: 2024-07-01 | End: 2024-07-01

## 2024-07-01 RX ORDER — CEFAZOLIN SODIUM 1 G/50ML
1000 SOLUTION INTRAVENOUS ONCE
Status: COMPLETED | OUTPATIENT
Start: 2024-07-01 | End: 2024-07-01

## 2024-07-01 RX ORDER — LIDOCAINE HCL/EPINEPHRINE/PF 2%-1:200K
VIAL (ML) INJECTION AS NEEDED
Status: DISCONTINUED | OUTPATIENT
Start: 2024-07-01 | End: 2024-07-01

## 2024-07-01 RX ORDER — HYDROMORPHONE HCL/PF 1 MG/ML
0.5 SYRINGE (ML) INJECTION EVERY 2 HOUR PRN
Status: DISCONTINUED | OUTPATIENT
Start: 2024-07-01 | End: 2024-07-03 | Stop reason: HOSPADM

## 2024-07-01 RX ORDER — DIPHENHYDRAMINE HYDROCHLORIDE 50 MG/ML
INJECTION INTRAMUSCULAR; INTRAVENOUS AS NEEDED
Status: DISCONTINUED | OUTPATIENT
Start: 2024-07-01 | End: 2024-07-01

## 2024-07-01 RX ORDER — BUPIVACAINE HYDROCHLORIDE 5 MG/ML
INJECTION, SOLUTION EPIDURAL; INTRACAUDAL AS NEEDED
Status: DISCONTINUED | OUTPATIENT
Start: 2024-07-01 | End: 2024-07-01

## 2024-07-01 RX ORDER — OXYCODONE HYDROCHLORIDE 10 MG/1
10 TABLET ORAL EVERY 4 HOURS PRN
Status: DISCONTINUED | OUTPATIENT
Start: 2024-07-01 | End: 2024-07-03 | Stop reason: HOSPADM

## 2024-07-01 RX ORDER — ACETAMINOPHEN 325 MG/1
975 TABLET ORAL EVERY 6 HOURS PRN
Status: DISCONTINUED | OUTPATIENT
Start: 2024-07-01 | End: 2024-07-03 | Stop reason: HOSPADM

## 2024-07-01 RX ORDER — METHYLERGONOVINE MALEATE 0.2 MG/ML
INJECTION INTRAVENOUS AS NEEDED
Status: DISCONTINUED | OUTPATIENT
Start: 2024-07-01 | End: 2024-07-01

## 2024-07-01 RX ORDER — MORPHINE SULFATE 0.5 MG/ML
INJECTION, SOLUTION EPIDURAL; INTRATHECAL; INTRAVENOUS AS NEEDED
Status: DISCONTINUED | OUTPATIENT
Start: 2024-07-01 | End: 2024-07-01

## 2024-07-01 RX ORDER — TRANEXAMIC ACID 10 MG/ML
INJECTION, SOLUTION INTRAVENOUS AS NEEDED
Status: DISCONTINUED | OUTPATIENT
Start: 2024-07-01 | End: 2024-07-01

## 2024-07-01 RX ORDER — OXYTOCIN/RINGER'S LACTATE 30/500 ML
1-30 PLASTIC BAG, INJECTION (ML) INTRAVENOUS
Status: DISCONTINUED | OUTPATIENT
Start: 2024-07-01 | End: 2024-07-02

## 2024-07-01 RX ORDER — OXYCODONE HYDROCHLORIDE 5 MG/1
5 TABLET ORAL EVERY 4 HOURS PRN
Status: DISCONTINUED | OUTPATIENT
Start: 2024-07-01 | End: 2024-07-03 | Stop reason: HOSPADM

## 2024-07-01 RX ORDER — ONDANSETRON 2 MG/ML
INJECTION INTRAMUSCULAR; INTRAVENOUS AS NEEDED
Status: DISCONTINUED | OUTPATIENT
Start: 2024-07-01 | End: 2024-07-01

## 2024-07-01 RX ADMIN — LIDOCAINE HYDROCHLORIDE,EPINEPHRINE BITARTRATE 5 ML: 20; .005 INJECTION, SOLUTION EPIDURAL; INFILTRATION; INTRACAUDAL; PERINEURAL at 22:49

## 2024-07-01 RX ADMIN — PENICILLIN G POTASSIUM 2.5 MILLION UNITS: 20000000 INJECTION, POWDER, FOR SOLUTION INTRAVENOUS at 20:29

## 2024-07-01 RX ADMIN — ROPIVACAINE HYDROCHLORIDE: 2 INJECTION, SOLUTION EPIDURAL; INFILTRATION at 11:00

## 2024-07-01 RX ADMIN — ROPIVACAINE HYDROCHLORIDE: 2 INJECTION, SOLUTION EPIDURAL; INFILTRATION at 21:37

## 2024-07-01 RX ADMIN — PHENYLEPHRINE HYDROCHLORIDE 50 MCG/MIN: 50 INJECTION INTRAVENOUS at 23:19

## 2024-07-01 RX ADMIN — LIDOCAINE HYDROCHLORIDE,EPINEPHRINE BITARTRATE 5 ML: 20; .005 INJECTION, SOLUTION EPIDURAL; INFILTRATION; INTRACAUDAL; PERINEURAL at 22:58

## 2024-07-01 RX ADMIN — PENICILLIN G POTASSIUM 2.5 MILLION UNITS: 20000000 INJECTION, POWDER, FOR SOLUTION INTRAVENOUS at 12:10

## 2024-07-01 RX ADMIN — ONDANSETRON 4 MG: 2 INJECTION INTRAMUSCULAR; INTRAVENOUS at 03:59

## 2024-07-01 RX ADMIN — PENICILLIN G POTASSIUM 2.5 MILLION UNITS: 20000000 INJECTION, POWDER, FOR SOLUTION INTRAVENOUS at 08:03

## 2024-07-01 RX ADMIN — AZITHROMYCIN MONOHYDRATE 500 MG: 500 INJECTION, POWDER, LYOPHILIZED, FOR SOLUTION INTRAVENOUS at 22:56

## 2024-07-01 RX ADMIN — METHYLERGONOVINE MALEATE 0.2 MG: 0.2 INJECTION INTRAVENOUS at 23:23

## 2024-07-01 RX ADMIN — ONDANSETRON 4 MG: 2 INJECTION INTRAMUSCULAR; INTRAVENOUS at 08:31

## 2024-07-01 RX ADMIN — Medication 2 MILLI-UNITS/MIN: at 05:42

## 2024-07-01 RX ADMIN — ACETAMINOPHEN 975 MG: 325 TABLET, FILM COATED ORAL at 17:07

## 2024-07-01 RX ADMIN — PENICILLIN G POTASSIUM 2.5 MILLION UNITS: 20000000 INJECTION, POWDER, FOR SOLUTION INTRAVENOUS at 16:30

## 2024-07-01 RX ADMIN — ROPIVACAINE HYDROCHLORIDE: 2 INJECTION, SOLUTION EPIDURAL; INFILTRATION at 03:10

## 2024-07-01 RX ADMIN — SODIUM CHLORIDE, SODIUM LACTATE, POTASSIUM CHLORIDE, AND CALCIUM CHLORIDE: .6; .31; .03; .02 INJECTION, SOLUTION INTRAVENOUS at 23:15

## 2024-07-01 RX ADMIN — LIDOCAINE HYDROCHLORIDE,EPINEPHRINE BITARTRATE 5 ML: 20; .005 INJECTION, SOLUTION EPIDURAL; INFILTRATION; INTRACAUDAL; PERINEURAL at 22:46

## 2024-07-01 RX ADMIN — ROPIVACAINE HYDROCHLORIDE: 2 INJECTION, SOLUTION EPIDURAL; INFILTRATION at 16:31

## 2024-07-01 RX ADMIN — SODIUM CHLORIDE, SODIUM LACTATE, POTASSIUM CHLORIDE, AND CALCIUM CHLORIDE: .6; .31; .03; .02 INJECTION, SOLUTION INTRAVENOUS at 22:53

## 2024-07-01 RX ADMIN — DEXAMETHASONE SODIUM PHOSPHATE 10 MG: 10 INJECTION, SOLUTION INTRAMUSCULAR; INTRAVENOUS at 22:56

## 2024-07-01 RX ADMIN — BUPIVACAINE HYDROCHLORIDE 4 ML: 5 INJECTION, SOLUTION EPIDURAL; INTRACAUDAL; PERINEURAL at 20:24

## 2024-07-01 RX ADMIN — METOCLOPRAMIDE 5 MG: 5 INJECTION, SOLUTION INTRAMUSCULAR; INTRAVENOUS at 23:29

## 2024-07-01 RX ADMIN — BUPIVACAINE HYDROCHLORIDE 4 ML: 2.5 INJECTION, SOLUTION EPIDURAL; INFILTRATION; INTRACAUDAL; PERINEURAL at 20:24

## 2024-07-01 RX ADMIN — TRANEXAMIC ACID 1000 MG: 10 INJECTION, SOLUTION INTRAVENOUS at 23:22

## 2024-07-01 RX ADMIN — ONDANSETRON 4 MG: 2 INJECTION INTRAMUSCULAR; INTRAVENOUS at 22:55

## 2024-07-01 RX ADMIN — LIDOCAINE HYDROCHLORIDE,EPINEPHRINE BITARTRATE 5 ML: 20; .005 INJECTION, SOLUTION EPIDURAL; INFILTRATION; INTRACAUDAL; PERINEURAL at 22:51

## 2024-07-01 RX ADMIN — MORPHINE SULFATE 3 MG: 0.5 INJECTION, SOLUTION EPIDURAL; INTRATHECAL; INTRAVENOUS at 23:43

## 2024-07-01 RX ADMIN — DIPHENHYDRAMINE HYDROCHLORIDE 12.5 MG: 50 INJECTION, SOLUTION INTRAMUSCULAR; INTRAVENOUS at 23:29

## 2024-07-01 RX ADMIN — PENICILLIN G POTASSIUM 2.5 MILLION UNITS: 20000000 INJECTION, POWDER, FOR SOLUTION INTRAVENOUS at 03:08

## 2024-07-01 RX ADMIN — CEFAZOLIN SODIUM 1000 MG: 1 SOLUTION INTRAVENOUS at 22:49

## 2024-07-01 RX ADMIN — ACETAMINOPHEN 975 MG: 325 TABLET, FILM COATED ORAL at 11:13

## 2024-07-01 NOTE — ANESTHESIA PREPROCEDURE EVALUATION
Procedure:  LABOR ANALGESIA    Relevant Problems   GYN   (+) 39 weeks gestation of pregnancy      PULMONARY   (+) Asthma        Physical Exam    Airway    Mallampati score: II         Dental   No notable dental hx     Cardiovascular      Pulmonary      Other Findings  post-pubertal.      Anesthesia Plan  ASA Score- 2     Anesthesia Type- epidural with ASA Monitors.         Additional Monitors:     Airway Plan:     Comment: I, Dr. White, the attending physician, have personally seen and evaluated the patient prior to anesthetic care.  I have reviewed the pre-anesthetic record, and other medical records if appropriate to the anesthetic care.  If a CRNA is involved in the case, I have reviewed the CRNA assessment, if present, and agree.  The patient is in a suitable condition to proceed with my formulated anesthetic plan.  .       Plan Factors-    Chart reviewed.                      Induction-     Postoperative Plan-     Perioperative Resuscitation Plan - Level 1 - Full Code.       Informed Consent- Anesthetic plan and risks discussed with patient.  I personally reviewed this patient with the CRNA. Discussed and agreed on the Anesthesia Plan with the CRNA..

## 2024-07-01 NOTE — PROGRESS NOTES
Presented to patient bedside to introduce myself as oncoming SOD/OBGYN Attending and discuss her labor course and plan of care.    Briefly, the patient is a 35yo  @ 39w1d, who was admitted for induction of labor.  She is GBS positive, currently receiving antibiotic prophylaxis.  H/o diet-controlled GDM, for diabetic protocol intrapartum, EFW 84th percentile, patient has been counseled on shoulder dystocia risk.  She is currently comfortable with epidural in place, in active labor now on pitocin, category 1 fetal heart tracing.  Will continue to titrate pitocin as needed and reassess on next exam.

## 2024-07-01 NOTE — OB LABOR/OXYTOCIN SAFETY PROGRESS
Oxytocin Safety Progress Check Note - Naresh Sarabia 34 y.o. female MRN: [id number]    Unit/Bed#: -01 Encounter: [id number]    Dose (joanne-units/min) Oxytocin: 6 joanne-units/min (per Dr. Avinash Contreras)  Contraction Frequency (minutes): 2-3  Contraction Intensity: Moderate  Uterine Activity Characteristics: Irregular  Cervical Dilation: 7        Cervical Effacement: 90  Fetal Station: 0  Baseline Rate (FHR): 130 bpm  Fetal Heart Rate (FHT): 127 BPM  FHR Category: 1               Vital Signs:   Vitals:    07/01/24 1430   BP: 109/58   Pulse:    Resp:    Temp:    SpO2:        Notes/comments:   Patient feeling more pressure. SVE unchanged. Continue pitocin titration     Ciaran Moody MD 7/1/2024 2:51 PM

## 2024-07-01 NOTE — OB LABOR/OXYTOCIN SAFETY PROGRESS
Oxytocin Safety Progress Check Note - Naresh Sarabia 34 y.o. female MRN: [id number]    Unit/Bed#: -01 Encounter: [id number]    Dose (joanne-units/min) Oxytocin: 8 joanne-units/min  Contraction Frequency (minutes): 1-4  Contraction Intensity: Moderate  Uterine Activity Characteristics: Coupling  Cervical Dilation: 6        Cervical Effacement: 70  Fetal Station: -2  Baseline Rate (FHR): 140 bpm  Fetal Heart Rate (FHT): 140 BPM  FHR Category: 1               Vital Signs:   Vitals:    07/01/24 0730   BP: 97/54   Pulse:    Resp:    Temp:    SpO2:        Notes/comments:     Naresh is comfortable with her epidural. On cervical exam, she is 6/70/-2. FHT is category I. Plan to recheck in 2hrs.       Zahraa Kebede MD 7/1/2024 9:02 AM

## 2024-07-01 NOTE — OB LABOR/OXYTOCIN SAFETY PROGRESS
Oxytocin Safety Progress Check Note - Naresh Sarabia 34 y.o. female MRN: [id number]    Unit/Bed#: -01 Encounter: [id number]    Dose (joanne-units/min) Oxytocin: 12 joanne-units/min  Contraction Frequency (minutes): 1-3.5  Contraction Intensity: Moderate  Uterine Activity Characteristics: Irregular  Cervical Dilation: 7        Cervical Effacement: 90  Fetal Station: 0  Baseline Rate (FHR): 140 bpm  Fetal Heart Rate (FHT): 140 BPM  FHR Category: 1               Vital Signs:   Vitals:    07/01/24 1200   BP: 101/64   Pulse: 71   Resp:    Temp: 98.3 °F (36.8 °C)   SpO2: 97%       Notes/comments:     Naresh is feeling intermittent pressure. On cervical exam, she is 7/90/0. FHT is cat 1. Plan to recheck in 2 hrs or sooner if clinically indicated      Zahraa Kebede MD 7/1/2024 12:29 PM

## 2024-07-01 NOTE — ANESTHESIA PROCEDURE NOTES
CSE Block    Patient location during procedure: OB  Start time: 7/1/2024 3:05 AM  Reason for block: procedure for pain and at surgeon's request  Staffing  Performed by: Dimitri White MD  Authorized by: Dimitri White MD    Preanesthetic Checklist  Completed: patient identified, IV checked, site marked, risks and benefits discussed, surgical consent, monitors and equipment checked, pre-op evaluation and timeout performed  CSE  Patient position: sitting  Prep: ChloraPrep  Patient monitoring: continuous pulse ox  Approach: midline  Spinal Needle  Needle type: pencil-tip   Needle gauge: 27 G  Needle length: 10 cm  Needle insertion depth: 7 cm  Epidural Needle  Injection technique: CECIL saline  Needle type: Tuohy   Needle gauge: 18 G  Needle length: 9 cm  Needle insertion depth: 6 cm  Location: L3-L4  Catheter  Catheter type: end hole  Catheter size: 18 G  Catheter at skin depth: 12 cm  Catheter securement method: stabilization device  Assessment  Sensory level: T10  Injection Assessment:  negative aspiration for heme, no pain on injection and no paresthesia on injection.

## 2024-07-01 NOTE — OB LABOR/OXYTOCIN SAFETY PROGRESS
Labor Progress Note - Naresh Sarabia 34 y.o. female MRN: [id number]    Unit/Bed#: -01 Encounter: [id number]    Dose (joanne-units/min) Oxytocin: 0 joanne-units/min (pt etienne every 1-3 min)  Contraction Frequency (minutes): 3-4.5  Contraction Intensity: Moderate/Strong  Uterine Activity Characteristics: Regular  Cervical Dilation: 6        Cervical Effacement: 70  Fetal Station: -2  Baseline Rate (FHR): 140 bpm  Fetal Heart Rate (FHT): 148 BPM  FHR Category: Cat 1               Vital Signs:   Vitals:    07/01/24 0316   BP: 116/62   Pulse: 74   Resp: 18   Temp: 98.2 °F (36.8 °C)   SpO2:        Notes/comments:   Naresh is now comfortable with her epidural.  Cervical exam is unchanged.  Forebag ruptured for clear fluid.  Pitocin previously ordered but was not yet started. Will start Pitocin at this time.  Will continue to monitor and reassess as indicated.    Linda Cota MD 7/1/2024 5:23 AM

## 2024-07-01 NOTE — OB LABOR/OXYTOCIN SAFETY PROGRESS
Labor Progress Note - Naresh Sarabia 34 y.o. female MRN: [id number]    Unit/Bed#: -01 Encounter: [id number]       Contraction Frequency (minutes): 1-3  Contraction Intensity: Mild  Uterine Activity Characteristics: Irregular  Cervical Dilation: Closed        Cervical Effacement: 0  Fetal Station: -3  Baseline Rate (FHR): 130 bpm  Fetal Heart Rate (FHT): 151 BPM  FHR Category: I               Vital Signs:   Vitals:    06/30/24 2300   BP:    Pulse:    Resp:    Temp: 98.7 °F (37.1 °C)       Notes/comments:   Patient's Sun balloon is still in place.  Patient requesting IV medication.  2 mg of morphine ordered.  She is etienne irregularly at times but often enough that Cytotec has not been redosed yet.  Plan to continue to observe uterine activity and reassess around midnight.  If regular contractions continue plan to start Pitocin.  If contractions spaced out will redose Cytotec.  FHT category 1 discussed with Dr. Cota.     Priya Ochoa MD 6/30/2024 11:43 PM

## 2024-07-01 NOTE — OB LABOR/OXYTOCIN SAFETY PROGRESS
Labor Progress Note - Naresh Sarabia 34 y.o. female MRN: [id number]    Unit/Bed#: -01 Encounter: [id number]    Dose (joanne-units/min) Oxytocin: 0 joanne-units/min (pt etienne every 1-3 min)  Contraction Frequency (minutes): 1-4  Contraction Intensity: Moderate  Uterine Activity Characteristics: Irregular  Cervical Dilation: 6        Cervical Effacement: 70  Fetal Station: -2  Baseline Rate (FHR): 135 bpm  Fetal Heart Rate (FHT): 148 BPM  FHR Category: 1               Vital Signs:   Vitals:    07/01/24 0200   BP: 107/56   Pulse:    Resp:    Temp:        Notes/comments:   Naresh is uncomfortable with her contractions, but breathing through them. Cervical exam significantly changed s/p ibarra balloon expulsion. Membrane noted to be spontaneously ruptured for clear fluids. Patient currently requesting epidural at this time. Plan is to start pitocin once patient is comfortable. Will look to monitor and reassess as indicated.      Nate Lewis MD 7/1/2024 2:44 AM

## 2024-07-01 NOTE — OB LABOR/OXYTOCIN SAFETY PROGRESS
Oxytocin Safety Progress Check Note - Naresh Sarabia 34 y.o. female MRN: [id number]    Unit/Bed#: -01 Encounter: [id number]    Dose (joanne-units/min) Oxytocin: 8 joanne-units/min  Contraction Frequency (minutes): 2-3  Contraction Intensity: Moderate/Strong  Uterine Activity Characteristics: Irregular  Cervical Dilation: 7        Cervical Effacement: 90  Fetal Station: 0  Baseline Rate (FHR): 130 bpm  Fetal Heart Rate (FHT): 127 BPM  FHR Category: I               Vital Signs:   Vitals:    07/01/24 1645   BP: 100/54   Pulse:    Resp:    Temp:    SpO2:        Notes/comments:   FHT cat I with spotty tracing due to fetal movement. SVE unchanged as above. Patient contraction every 1-2. IUPC placed to assess adequacy of contractions. Baby palpated to be ROP. Plan for SPARK maneuvers to encourage fetal descent.     Priya Ochoa MD 7/1/2024 5:30 PM

## 2024-07-02 PROBLEM — Z98.891 S/P PRIMARY LOW TRANSVERSE C-SECTION: Status: ACTIVE | Noted: 2024-03-31

## 2024-07-02 LAB
BASOPHILS # BLD AUTO: 0.06 THOUSANDS/ÂΜL (ref 0–0.1)
BASOPHILS NFR BLD AUTO: 0 % (ref 0–1)
EOSINOPHIL # BLD AUTO: 0.01 THOUSAND/ÂΜL (ref 0–0.61)
EOSINOPHIL NFR BLD AUTO: 0 % (ref 0–6)
ERYTHROCYTE [DISTWIDTH] IN BLOOD BY AUTOMATED COUNT: 14.3 % (ref 11.6–15.1)
HCT VFR BLD AUTO: 30.9 % (ref 34.8–46.1)
HGB BLD-MCNC: 10.6 G/DL (ref 11.5–15.4)
IMM GRANULOCYTES # BLD AUTO: 0.35 THOUSAND/UL (ref 0–0.2)
IMM GRANULOCYTES NFR BLD AUTO: 2 % (ref 0–2)
LYMPHOCYTES # BLD AUTO: 1.12 THOUSANDS/ÂΜL (ref 0.6–4.47)
LYMPHOCYTES NFR BLD AUTO: 5 % (ref 14–44)
MCH RBC QN AUTO: 32.5 PG (ref 26.8–34.3)
MCHC RBC AUTO-ENTMCNC: 34.3 G/DL (ref 31.4–37.4)
MCV RBC AUTO: 95 FL (ref 82–98)
MONOCYTES # BLD AUTO: 0.98 THOUSAND/ÂΜL (ref 0.17–1.22)
MONOCYTES NFR BLD AUTO: 4 % (ref 4–12)
NEUTROPHILS # BLD AUTO: 20.84 THOUSANDS/ÂΜL (ref 1.85–7.62)
NEUTS SEG NFR BLD AUTO: 89 % (ref 43–75)
NRBC BLD AUTO-RTO: 0 /100 WBCS
PLATELET # BLD AUTO: 203 THOUSANDS/UL (ref 149–390)
PMV BLD AUTO: 10.8 FL (ref 8.9–12.7)
RBC # BLD AUTO: 3.26 MILLION/UL (ref 3.81–5.12)
WBC # BLD AUTO: 23.36 THOUSAND/UL (ref 4.31–10.16)

## 2024-07-02 PROCEDURE — 85025 COMPLETE CBC W/AUTO DIFF WBC: CPT

## 2024-07-02 PROCEDURE — 99232 SBSQ HOSP IP/OBS MODERATE 35: CPT | Performed by: OBSTETRICS & GYNECOLOGY

## 2024-07-02 RX ORDER — CITRIC ACID/SODIUM CITRATE 334-500MG
30 SOLUTION, ORAL ORAL 4 TIMES DAILY PRN
Status: CANCELLED | OUTPATIENT
Start: 2024-07-02

## 2024-07-02 RX ORDER — SIMETHICONE 80 MG
80 TABLET,CHEWABLE ORAL 4 TIMES DAILY PRN
Status: DISCONTINUED | OUTPATIENT
Start: 2024-07-02 | End: 2024-07-03 | Stop reason: HOSPADM

## 2024-07-02 RX ORDER — DIPHENHYDRAMINE HCL 25 MG
25 TABLET ORAL EVERY 6 HOURS PRN
Status: CANCELLED | OUTPATIENT
Start: 2024-07-02

## 2024-07-02 RX ORDER — FENTANYL CITRATE/PF 50 MCG/ML
50 SYRINGE (ML) INJECTION
Status: DISCONTINUED | OUTPATIENT
Start: 2024-07-02 | End: 2024-07-02

## 2024-07-02 RX ORDER — BENZOCAINE/MENTHOL 6 MG-10 MG
1 LOZENGE MUCOUS MEMBRANE DAILY PRN
Status: CANCELLED | OUTPATIENT
Start: 2024-07-02

## 2024-07-02 RX ORDER — BENZOCAINE/MENTHOL 6 MG-10 MG
1 LOZENGE MUCOUS MEMBRANE DAILY PRN
Status: DISCONTINUED | OUTPATIENT
Start: 2024-07-02 | End: 2024-07-03 | Stop reason: HOSPADM

## 2024-07-02 RX ORDER — NALBUPHINE HYDROCHLORIDE 10 MG/ML
5 INJECTION, SOLUTION INTRAMUSCULAR; INTRAVENOUS; SUBCUTANEOUS
Status: DISPENSED | OUTPATIENT
Start: 2024-07-02 | End: 2024-07-03

## 2024-07-02 RX ORDER — SODIUM CHLORIDE, SODIUM LACTATE, POTASSIUM CHLORIDE, CALCIUM CHLORIDE 600; 310; 30; 20 MG/100ML; MG/100ML; MG/100ML; MG/100ML
20 INJECTION, SOLUTION INTRAVENOUS CONTINUOUS
Status: DISCONTINUED | OUTPATIENT
Start: 2024-07-02 | End: 2024-07-02

## 2024-07-02 RX ORDER — DOCUSATE SODIUM 100 MG/1
100 CAPSULE, LIQUID FILLED ORAL 2 TIMES DAILY
Status: DISCONTINUED | OUTPATIENT
Start: 2024-07-02 | End: 2024-07-03 | Stop reason: HOSPADM

## 2024-07-02 RX ORDER — CALCIUM CARBONATE 500 MG/1
1000 TABLET, CHEWABLE ORAL DAILY PRN
Status: CANCELLED | OUTPATIENT
Start: 2024-07-02

## 2024-07-02 RX ORDER — ACETAMINOPHEN 325 MG/1
650 TABLET ORAL EVERY 6 HOURS SCHEDULED
Status: CANCELLED | OUTPATIENT
Start: 2024-07-02

## 2024-07-02 RX ORDER — DOCUSATE SODIUM 100 MG/1
100 CAPSULE, LIQUID FILLED ORAL 2 TIMES DAILY
Status: CANCELLED | OUTPATIENT
Start: 2024-07-02

## 2024-07-02 RX ORDER — ACETAMINOPHEN 325 MG/1
650 TABLET ORAL EVERY 6 HOURS SCHEDULED
Status: DISCONTINUED | OUTPATIENT
Start: 2024-07-02 | End: 2024-07-03 | Stop reason: HOSPADM

## 2024-07-02 RX ORDER — SIMETHICONE 80 MG
80 TABLET,CHEWABLE ORAL 4 TIMES DAILY PRN
Status: CANCELLED | OUTPATIENT
Start: 2024-07-02

## 2024-07-02 RX ORDER — ONDANSETRON 2 MG/ML
4 INJECTION INTRAMUSCULAR; INTRAVENOUS ONCE AS NEEDED
Status: DISCONTINUED | OUTPATIENT
Start: 2024-07-02 | End: 2024-07-02

## 2024-07-02 RX ORDER — SODIUM CHLORIDE, SODIUM LACTATE, POTASSIUM CHLORIDE, CALCIUM CHLORIDE 600; 310; 30; 20 MG/100ML; MG/100ML; MG/100ML; MG/100ML
125 INJECTION, SOLUTION INTRAVENOUS CONTINUOUS
Status: CANCELLED | OUTPATIENT
Start: 2024-07-02

## 2024-07-02 RX ORDER — ONDANSETRON 2 MG/ML
4 INJECTION INTRAMUSCULAR; INTRAVENOUS EVERY 8 HOURS PRN
Status: CANCELLED | OUTPATIENT
Start: 2024-07-02

## 2024-07-02 RX ORDER — IBUPROFEN 600 MG/1
600 TABLET ORAL EVERY 6 HOURS
Status: DISCONTINUED | OUTPATIENT
Start: 2024-07-03 | End: 2024-07-03 | Stop reason: HOSPADM

## 2024-07-02 RX ORDER — ONDANSETRON 2 MG/ML
4 INJECTION INTRAMUSCULAR; INTRAVENOUS EVERY 8 HOURS PRN
Status: DISCONTINUED | OUTPATIENT
Start: 2024-07-02 | End: 2024-07-03 | Stop reason: HOSPADM

## 2024-07-02 RX ORDER — KETOROLAC TROMETHAMINE 30 MG/ML
30 INJECTION, SOLUTION INTRAMUSCULAR; INTRAVENOUS EVERY 6 HOURS SCHEDULED
Status: COMPLETED | OUTPATIENT
Start: 2024-07-02 | End: 2024-07-03

## 2024-07-02 RX ORDER — OXYTOCIN/RINGER'S LACTATE 30/500 ML
62.5 PLASTIC BAG, INJECTION (ML) INTRAVENOUS ONCE
Status: CANCELLED | OUTPATIENT
Start: 2024-07-02 | End: 2024-07-02

## 2024-07-02 RX ADMIN — KETOROLAC TROMETHAMINE 30 MG: 30 INJECTION, SOLUTION INTRAMUSCULAR; INTRAVENOUS at 11:41

## 2024-07-02 RX ADMIN — DOCUSATE SODIUM 100 MG: 100 CAPSULE, LIQUID FILLED ORAL at 19:22

## 2024-07-02 RX ADMIN — SODIUM CHLORIDE, SODIUM LACTATE, POTASSIUM CHLORIDE, AND CALCIUM CHLORIDE 20 ML/HR: .6; .31; .03; .02 INJECTION, SOLUTION INTRAVENOUS at 01:00

## 2024-07-02 RX ADMIN — DOCUSATE SODIUM 100 MG: 100 CAPSULE, LIQUID FILLED ORAL at 09:38

## 2024-07-02 RX ADMIN — ACETAMINOPHEN 650 MG: 325 TABLET, FILM COATED ORAL at 11:41

## 2024-07-02 RX ADMIN — KETOROLAC TROMETHAMINE 30 MG: 30 INJECTION, SOLUTION INTRAMUSCULAR; INTRAVENOUS at 06:20

## 2024-07-02 RX ADMIN — ACETAMINOPHEN 650 MG: 325 TABLET, FILM COATED ORAL at 06:20

## 2024-07-02 RX ADMIN — ACETAMINOPHEN 650 MG: 325 TABLET, FILM COATED ORAL at 19:22

## 2024-07-02 RX ADMIN — KETOROLAC TROMETHAMINE 30 MG: 30 INJECTION, SOLUTION INTRAMUSCULAR; INTRAVENOUS at 19:25

## 2024-07-02 NOTE — PLAN OF CARE
Problem: Knowledge Deficit  Goal: Verbalizes understanding of labor plan  Description: Assess patient/family/caregiver's baseline knowledge level and ability to understand information.  Provide education via patient/family/caregiver's preferred learning method at appropriate level of understanding.     1. Provide teaching at level of understanding.  2. Provide teaching via preferred learning method(s).  Outcome: Progressing

## 2024-07-02 NOTE — LACTATION NOTE
This note was copied from a baby's chart.  CONSULT - LACTATION  Baby Girl (Rae Sarabia 1 days female MRN: [id number]    [facility] NURSERY Room / Bed: (N)/(N) Encounter: [id number]    Maternal Information     MOTHER:  Naresh Sarabia  Maternal Age: 34 y.o.  OB History: # 1 - Date: 24, Sex: Female, Weight: 3160 g (6 lb 15.5 oz), GA: 39w1d, Type: , Low Transverse, Apgar1: 8, Apgar5: 9, Living: Living, Birth Comments: None   Previouse breast reduction surgery? No    Lactation history:   Has patient previously breast fed: No   How long had patient previously breast fed:     Previous breast feeding complications:       Past Surgical History:   Procedure Laterality Date    APPENDECTOMY      LASIK Bilateral        Birth information:  YOB: 2022   Time of birth: 11:16 PM   Sex: female   Delivery type: , Low Transverse   Birth Weight: 3160 g (6 lb 15.5 oz)   Percent of Weight Change: 0%     Gestational Age: 39w1d   [unfilled]    Assessment     Breast and nipple assessment: normal assessment     Assessment: suck issue, digital oral exam findings: Infant has head preference facing the right side. Infant does not demonstrate wide gape for digital oral exam. Infant does not elevate tongue or extend tongue past lower alveolar ridge. Upon insertion of pinky finger, infant clamps down on finger with both gums onto finger and no suck is noted. Support applied to jaws and infant begins to attempt to move tongue in sucking motion.     Feeding assessment: feeding well  LATCH:  Latch: Too sleepy or reluctant, no latch achieved   Audible Swallowing: None   Type of Nipple: Everted (After stimulation)   Comfort (Breast/Nipple): Soft/non-tender   Hold (Positioning): Partial assist, teach one side, mother does other, staff holds   LATCH Score: 5        Breastfeeding Status Yes   Breastfeeding Progress Not yet established   Other OB  Lactation Tools   Feeding Devices Pump;Syringe   Breast Pump   Pump 3;2;1  (Spectra S2 & [employer])   Pump Review/Education Setup, frequency, and cleaning;Milk storage   Initiated by GIULIANA SALAZAR   Date Initiated 07/02/24   Patient Follow-Up   Lactation Consult Status 2   Follow-Up Type Inpatient;Call as needed   Other OB Lactation Documentation    Additional Problem Noted RSB/DC. set up with pumping d/t no latch, syringe feeding       Feeding recommendations:  breast feed on demand  Mother reports she had trouble latching infant over night and she began pumping. She reports she pumped 0.8mL with first session and 1.0mL with second pumping, both which were fed to infant.     While infant was in bassinet, digital oral exam performed. Infant has head turned facing the right side. Infant does not demonstrate wide gape for digital oral exam. Infant does not elevate tongue or extend tongue past lower alveolar ridge. Upon insertion of pinky finger, infant clamps down on finger with both gums onto finger and no suck is noted. Support applied to jaws and infant begins to attempt to move tongue in sucking motion.     Infant was placed s2s with mother for feeding and then moved into football hold on left breast. Infant aligned but does not demonstrate wide gape and continues to turn head to the right side with no latch obtained. Infant brought back S2S with mother and allowed to moved down to left breast. Infant belly to belly with mom and aligned into cross-cradle hold on left breast, Infant did not obtained latch.    Mother set up with electric breast pump and edc on pumping for 15 minutes and then using hand pump or hand expression for 1-3 minutes on each breast. Education provided on milk storage guidelines and pump cleaning. Remained with family for pumping session and 0.1mL obtained. Mother using 19mm flange inserts from home.     RSB and DC booklets provided.     Mother has Spectra S2 through insurance and [employer] BP.      Encouraged to continue to attempt feeding at the breast and if no latch or poor feeding, use electric breast pump.     Met with mother. Provided mother with Ready, Set, Baby booklet.    Information on hand expression given. Discussed benefits of knowing how to manually express breast including stimulating milk supply, softening nipple for latch and evacuating breast in the event of engorgement.    Mom is encouraged to place baby skin to skin for feedings. Skin to skin education provided for baby placement on mother's chest, baby only in diaper, blankets below shoulders on baby's back. Skin to skin is encouraged to continue at home for feedings and between feedings.    - Start feedings on breast that last feeding ended   - allow no more than 3 hours between breast feeding sessions   - time between feedings is counted from the beginning of the first feed to the beginning of the next feeding session    Reviewed early signs of hunger, including tensing of hands and shoulders - no need to wait for open eyes.  Crying is a late hunger sign.  If baby is crying, soothe baby first and then attempt to latch.  Reviewed normal sucking patterns: transition from stimulation to nutritive to release or non-nutritive. The goal is to see and hear lots of swallowing.    Reviewed normal nursing pattern: infant could latch on one breast up to 30 minutes or until releases on own. Signs of satiation is open hand with fingers that do not grab your finger.  Discussed difference in sensation of non-nutritive v nutritive sucking    Discussed Skin to Skin contact an benefits to mom and baby.  Talked about the delay of the first bath until baby has adjusted. Spoke about the benefits of rooming in. Feeding on cue and what that means for recognizing infant's hunger. Avoidance of pacifiers for the first month discussed. Talked about exclusive breastfeeding for the first 6 months.    Positioning and latch reviewed as well as showing images of  other feeding positions.  Discussed the properties of a good latch in any position. Reviewed hand/manual expression.  Discussed s/s that baby is getting enough milk and some s/s that breastfeeding dyad may need further help.    Gave information on common concerns, what to expect the first few weeks after delivery, preparing for other caregivers, and how partners can help. Resources for support also provided.    Encouraged parents to call for assistance, questions, and concerns about breastfeeding.  Extension provided.      Rocio Alfaro RN 7/2/2024 10:17 AM

## 2024-07-02 NOTE — DISCHARGE SUMMARY
Discharge Summary - Naresh Sarabia 34 y.o. female MRN: [id number]    Unit/Bed#: LD PACU-01 Encounter: [id number]    Admission Date: 2024     Discharge Date: 7/3/2024    Admitting Attending: Linda Cota MD   Delivering Attending: Cristina Johnston MD   Discharge Attending: Dr. Lopez    Diagnosis:  Pregnancy at 39 weeks of gestation  A1 GDM  Arrest of dilation  Status post primary low-transverse  section    Procedures: Primary low transverse  section     Complications: none apparent     Naresh Sarabia is a 34-year-old G1 who presented for induction of labor.  On arrival to labor and delivery she was found to be closed.  A Sun balloon was placed for cervical ripening.  She received a dose of Cytotec for cervical ripening.  She received an epidural for analgesia. Forebag was AROM'd for clear fluid. She made made cervical change to 7 cm and was noted to be unchanged on several rechecks. She met criteria to arrest of dilation and the decision was made to proceed with 1LTCS.     She underwent an uncomplicated  delivery.  She delivered a viable female  at 2316 on 24. Weight was 6lbs 15.5oz (3160g) with APGARs of 8 and 9 at 1 and 5 minutes. Her preoperative Hb was 11.4. Her postoperative Hb was 10.6.  Her postoperative course was uncomplicated.     Condition at discharge: good     On day of discharge pain was well controlled, patient was tolerating PO, passing flatus,  a bowel movement. She was discharged with standard post partum/ post operative instructions to follow up with her physician in 1 week for an incision check and in 3-6 weeks for a postpartum appointment.     Discharge instructions/Information to patient and family:   -Do not place anything (no partner, tampons or douche) in your vagina for 6 weeks.  -You may walk for exercise for the first 6 weeks then gradually return to your usual activities.   -Please do not drive for 1 week if you have no stitches  and for 2 weeks if you have stitches or underwent a  delivery.    -You may take baths or shower per your preference.   -Please look at your bust (breasts) in the mirror daily and call for redness or tenderness or increased warmth.   -Please call us for temperature > 100.4*F or 38* C, worsening pain or a foul discharge.      Discharge Medications:   Prenatal vitamin daily for 6 months or the duration of nursing whichever is longer.  Motrin 600 mg orally every 6 hours as needed for pain  Tylenol (over the counter) per bottle directions as needed for pain: do NOT use with percocet  Hydrocortisone cream 1% (over the counter) applied 1-2x daily to hemorrhoids as needed  Percocet as needed    Provisions for Follow-Up Care:  Follow up with your doctor in 1 week for incision site check.     Planned Readmission: no

## 2024-07-02 NOTE — PROGRESS NOTES
"Progress Note - OB/GYN  Naresh Sarabia 34 y.o. female MRN: [id number]  Unit/Bed#:  314-01 Encounter: [id number]    Assessment and Plan     Naresh Sarabia is a patient of: [facility]. She is PPD# 1 s/p  primary  section, low transverse incision  Recovering well and is stable       * S/P primary low transverse   Assessment & Plan  , Hgb 11.4 --> f/u post op CBC  Lines: ibarra in place, UOP adequate   Pain: Tylenol and toradol scheduled, citlali 5/10 PRN    FEN: Tolerating regular diet  DVT ppx: SCDs and Lovenox 40mg qD pending CBC  Passing flatus   Incision C/D/I      Diet controlled gestational diabetes mellitus (GDM) in third trimester  Assessment & Plan  Plan for 2 hr GTT 6 weeks post partum    Nonimmune to hepatitis B virus  Assessment & Plan  Offer vaccination post partum     Asthma  Assessment & Plan  Albuterol as needed  Avoid hemabate        Disposition    - Anticipate discharge home on PPD# 1      Subjective/Objective     Chief Complaint: Postpartum State     Subjective:    Naresh Sarabia is PPD/POD#1 s/p  primary  section, low transverse incision. She has no current complaints.  Pain is well controlled.  Patient is currently voiding with ibarra in place, plan for TOV this AM.  She is ambulating.  Patient is currently passing flatus and has had no bowel movement. She is tolerating PO, and denies nausea or vomitting. Patient denies fever, chills, chest pain, shortness of breath, or calf tenderness. Lochia is normal. She is recovering well and is stable.       Vitals:   /63 (BP Location: Right arm)   Pulse 64   Temp 98.6 °F (37 °C) (Oral)   Resp 19   Ht 4' 11\" (1.499 m)   Wt 77.1 kg (170 lb)   LMP 10/01/2023   SpO2 96%   Breastfeeding Unknown   BMI 34.34 kg/m²       Intake/Output Summary (Last 24 hours) at 2024 0619  Last data filed at 2024 0200  Gross per 24 hour   Intake 1700 ml   Output 2667 ml   Net -967 ml       Invasive " Devices       Peripheral Intravenous Line  Duration             Peripheral IV 06/30/24 Dorsal (posterior);Left Hand 1 day    Peripheral IV 07/01/24 Proximal;Right;Ventral (anterior) Forearm <1 day              Drain  Duration             Urethral Catheter Non-latex 16 Fr. <1 day                    Physical Exam:   GEN: Kalynridhara Sarabia appears well, alert and oriented x 3, pleasant and cooperative   CARDIO: RRR, no murmurs or rubs  RESP:  CTAB, no wheezes or rales  ABDOMEN: soft, no tenderness, no distention, fundus firm below umbilicus, Incision C/D/I  EXTREMITIES: non tender, no erythema, b/l Ezio's sign negative      Labs:     Hemoglobin   Date Value Ref Range Status   06/30/2024 11.4 (L) 11.5 - 15.4 g/dL Final   04/08/2024 10.6 (L) 11.5 - 15.4 g/dL Final     WBC   Date Value Ref Range Status   06/30/2024 10.73 (H) 4.31 - 10.16 Thousand/uL Final   04/08/2024 11.14 (H) 4.31 - 10.16 Thousand/uL Final     Platelets   Date Value Ref Range Status   06/30/2024 252 149 - 390 Thousands/uL Final   04/08/2024 239 149 - 390 Thousands/uL Final     Creatinine   Date Value Ref Range Status   11/29/2023 0.58 0.40 - 1.10 mg/dL Final   07/13/2022 0.79 0.40 - 1.10 mg/dL Final     AST   Date Value Ref Range Status   11/29/2023 13 <41 U/L Final   07/13/2022 11 <41 U/L Final     ALT   Date Value Ref Range Status   11/29/2023 15 <56 U/L Final   07/13/2022 16 <56 U/L Final          Priya Ochoa MD  7/2/2024  6:19 AM

## 2024-07-02 NOTE — ANESTHESIA POSTPROCEDURE EVALUATION
Post-Op Assessment Note    CV Status:  Stable  Pain Score: 0    Pain management: adequate      Post-op block assessment: catheter intact and no complications   Mental Status:  Alert and awake   Hydration Status:  Euvolemic   PONV Controlled:  Controlled   Airway Patency:  Patent     Post Op Vitals Reviewed: Yes    No anethesia notable event occurred.    Staff: CRNA               BP   123/69   Temp      Pulse  78   Resp   16   SpO2   97

## 2024-07-02 NOTE — OP NOTE
OPERATIVE REPORT  PATIENT NAME: Naresh Sarabia    :  1990  MRN: [id number]  Pt Location: AN L&D OR ROOM 02    SURGERY DATE: 2024    Surgeons and Role:     * Cristina Johnston MD - Primary     * Whitney Chaparro MD - Assisting    Preop Diagnosis:  Arrest of dilation, delivered, current hospitalization [O62.1]  Pregnancy at 39 weeks of gestation    Post-Op Diagnosis Codes:     * Arrest of dilation, delivered, current hospitalization [O62.1]  Pregnancy at 39 weeks of gestation    Procedure(s) (LRB):   SECTION () (N/A)    Specimen(s):  ID Type Source Tests Collected by Time Destination   A :  Cord Blood Cord BLOOD GAS, VENOUS, CORD Cristina Johnston MD 2024    B :  Cord Blood Cord BLOOD GAS, ARTERIAL, CORD (Canceled) Cristina Johnston MD 20248    C :  Tissue (Placenta on Hold) OB Only Placenta PLACENTA IN STORAGE Cristina Johnston MD 2024 2322        Surgical QBL:  Surgical QBL (mL): 717 mL      Drains:  Sun catheter    IV Fluids:   1.7L LR    Anesthesia Type:   Epidural     Operative Indications:  Arrest of dilation, delivered, current hospitalization [O62.1]  Pregnancy at 39 weeks of gestation      Jun Group Classification System:  No Multiple pregnancy, No Transverse or oblique lie, No Breech lie, Gestational age is > or =37 weeks, Nulliparous, Labor induced +  is JUN GROUP 2a    Complications:   None Apparent      Operative Findings:  1. Viable female  at 2316 with APGARs of 8 and 9 at 1 and 5 minutes. Fetus weighted 6lb 15.5oz.  2. Normal intact placenta with centrally inserted 3VC.  3. Normal uterus, bilateral tubes and ovaries.    Umbilical Cord Venous Blood Gas:  Results from last 7 days   Lab Units 248   PH COV  7.380   PCO2 COV mm HG 34.9   HCO3 COV mmol/L 20.2   BASE EXC COV mmol/L -4.1*   O2 CT CD VB mL/dL 14.2   O2 HGB, VENOUS CORD % 64.9     Umbilical Cord Arterial Blood Gas:  Insufficient Sample     Brief  History:   Naresh Sarabia is a 34-year-old G1 who presented for induction of labor.  On arrival to labor and delivery she was found to be closed.  A Sun balloon was placed for cervical ripening.  She received a dose of Cytotec for cervical ripening.  She received an epidural for analgesia. Forebag was AROM'd for clear fluid. She made made cervical change to 7 cm and was noted to be unchanged on several rechecks. She met criteria to arrest of dilation and the decision was made to proceed with 1LTCS.    Procedure and Technique:  The patient was taken to the operating room. Epidural anesthesia was adequately established and 1 gram of ancef/500 mg of azithromycin were given for preoperative prophylaxis. The patient was then placed in the dorsal supine position with a left tilt of the hips. The patient was then prepped with chlorhexidine for vaginal prep and chloraprep for abdominal prep and draped in the usual sterile fashion for a Pfannenstiel skin incision.      A time out was performed to confirm correct patient and correct procedure.     An incision was made in the skin with a surgical scalpel and sharp dissection was carried out over subsequent layers of tissue including the fascia, followed by the Bovie electrocautery for hemostasis. The fascia was incised at the midline a kita clamp was then used to elevate the fascia which was incised with bovie electrocautery.     The superior edge of the fascial incision was grasped with Kocher clamps, tented up and the underlying rectus muscles were dissected off bluntly and sharply using the curved mayos. The rectus muscles were then divided at midline and the peritoneum was identified, tented up at its upper margin taking care to avoid the bladder, and then entered. The peritoneal incision was extended superiorly and inferiorly.     The bladder blade was inserted and a transverse incision was made in the lower uterine segment using a new surgical blade. The uterine  incision was extended cephalad and caudal using blunt dissection. The amniotic sac was entered and the amniotic fluid was noted to be meconium stained .    The surgeon's hand was placed into the uterine cavity. The fetal head was identified and elevated through the uterine incision with the assistance of fundal pressure. With gentle traction, the shoulder was delivered, followed by the rest of the fetal body. There was no nuchal cord noted. On delivery the cord was doubly clamped and cut after delayed cord clamping. The infant was then passed off the table to the awaiting  staff. The  was noted to cry spontaneously and moved all extremities. Venous and arterial blood gas, cord blood, and portion of cord was obtained for analysis and routine blood testing. The placenta delivery was then sent to storage. Placenta was noted to be intact with a centrally inserted three-vessel cord.  Oxytocin was administered by IV infusion to enhance uterine contraction. The uterus was exteriorized and cleared of all clots and remaining products of conception.      The uterine incision was re approximated using a 0-vicryl in a running locked fashion. A second horizontal imbricating stitch with 0-vicryl was applied. The uterine incision was inspected and there was noted to be a small area of bleeding at the midline. A single figure of eight stitch was placed. The incision was reexamined and noted to be hemostatic. Uterine tone was noted to be boggy at this time. A dose of methergine was administered as well as TXA 1g IV, after which uterine tone improved. The posterior cul-de-sac was cleared of all clots and products of conception. The uterus was replaced into the abdomen and the pericolic gutters were cleared of all clots.  The uterine incision was once again reexamined and noted to be hemostatic.     The fascia was re approximated using 0-fascia in a running nonlocked fashion started at the bilateral apexes and meeting  at the midline. The subcutaneous tissue was irrigated and cleared of all clots and debris. Good hemostasis was achieved with Bovie electrocautery. The subcutaneous tissue was reapproximated with 2-0 plain. The skin incision was closed using 3-0 monocryl and steri-strips. Good hemostasis was noted. Patient tolerated the procedure well. All needle, sponge, and instrument counts were noted to be correct x 2 at the end of the procedure.  Patient was transferred to the recovery room in stable condition. Dr. Johnston was present for the procedure.      Patient Disposition:  PACU         SIGNATURE: Whitney Chaparro MD  DATE: July 2, 2024  TIME: 1:11 AM

## 2024-07-02 NOTE — OB LABOR/OXYTOCIN SAFETY PROGRESS
Oxytocin Safety Progress Check Note - Naresh Sarabia 34 y.o. female MRN: [id number]    Unit/Bed#: -01 Encounter: [id number]    Dose (joanne-units/min) Oxytocin: 12 joanne-units/min  Contraction Frequency (minutes): 2-3  Contraction Intensity: Moderate/Strong  Uterine Activity Characteristics: Irregular  Cervical Dilation: 9        Cervical Effacement: 90  Fetal Station: 1  Baseline Rate (FHR): 130 bpm  Fetal Heart Rate (FHT): 127 BPM  FHR Category: 1             Vital Signs:    Vitals:    07/01/24 1931   BP: 114/55   Pulse: 75   Resp:    Temp:    SpO2:        Notes/comments:   Asked to see patient as she has urge to push and is very uncomfortable.   She had discomfort/pain during my exam.   Baby now OA, which is why I believe she is feeling so much pressure.   + caput. Vertex descends to +1 with contraction.  Pelvis feels adequate.     Will call anesthesia team for bolus.   Re-evaluate in one hour.   Patient verbalizes understanding and agreement with plan.     Tricia Martins DO 7/1/2024 8:22 PM

## 2024-07-02 NOTE — PLAN OF CARE

## 2024-07-02 NOTE — PROGRESS NOTES
DELIVERY DECISION NOTE:    Patient significantly uncomfortable despite anesthesia top off, requested evaluation by attending OB.    On my cervical exam, no cervical change made with significant cervical swelling noted, on my exam 7cm dilated/80% effaced/0 station.    Discussion held with patient, her partner, and her support family members in their preferred language of [place].  Discussed no cervical change since 12pm at 7cm despite pitocin with IUPC in place and adequate contractions, diagnosis at this time is arrest of dilation and  delivery is recommended.  Patient also notes significant discomfort and prefers  delivery at this time.    Reviewed risks/benefits/alternatives including continued pitocin titration, however patient desires  section at this time.    OR, nursing, and anesthesia teams notified and mobilized for  delivery.

## 2024-07-02 NOTE — LACTATION NOTE
"This note was copied from a baby's chart.  Follow up: parents called for help with feeding. Parents state that baby has not latched or eaten in a few hrs. Parents want Dbm. Ack. Form signed.     Ed. On breast - supplement - breast.    Warmed 12 mls of DBM and fed via 5 Chinese tube with baby latching on the left breast. Active, coordinated sucks noted. Teach-back with partners of sns.    Enc. To call lactation for assistance.    Information given and discussed on breastfeeding a late  infant.  Discussed sleepiness, maintaining body temperature, the lack of stamina necessitating shorter feedings. Encouraged feeding every 2-3 hours around the clock followed by hand expressing/pumping.    Pumping:   - When pumping, begin in stimulation mode (high cycle, low vacuum) until milk begins to express. Change pump to expression mode (low cycle, high vacuum). Use hands on pumping techniques to assist with milk transfer. When milk stops expressing, change back to stimulation mode. When milk begins to flow, change to expression mode. You make cycle pump up to three times in a pumping session.    Spectra Education on turning on the pump, press the 3 wavy lines to place pump on stimulation mode (high cycle, low vacuum) Set vacuum to comfort with light suction. After 3 min, press 3 wavy lines and change setting to Expression mode (low Cycle, High vacuum) Vacuum setting should not pinch, only tug the nipple. Now pump is set. Next time mom pumps, will only need to turn on pump and press 3 wavy line button to change cycle three times in a pumping session.     Discharge feeding plan    1. Meet early feeding cues  2. Use massage, warmth, hand expression to stimulate breasts  3. Pump (btw. 5-10 min.) the side the baby is going to latch. This will assist in elongating the nipple  4. Set up SNS to the breast  5. Bring baby to breast skin to skin  6. \"U\" shape hold of the breast  7. Align nipple to nose, drag nipple to chin, (move baby " "not breast) and bring baby to breast when mouth is wide and deep latch is achieved.  8. Unclip SNS to allow flow  9. From \"U\" shape hold under the breast, move fingers to provide tea-cup hold of the breast at the lower anushka of baby's mouth  10. Once baby does not suck with stimulation, becomes fussy, or un-latches feed expressed milk or DBM via alternative feeding method (finger feed, paced bottle)  11. Bring baby back to breast for non-nutritive suck and skin to skin  12. Pump after each feed to stimulate breasts and have expressed milk for next feed    When not using SNS, use the paced bottle feeding method for feeds.    "

## 2024-07-03 VITALS
DIASTOLIC BLOOD PRESSURE: 67 MMHG | HEART RATE: 85 BPM | BODY MASS INDEX: 34.27 KG/M2 | HEIGHT: 59 IN | TEMPERATURE: 98.1 F | OXYGEN SATURATION: 96 % | WEIGHT: 170 LBS | SYSTOLIC BLOOD PRESSURE: 99 MMHG | RESPIRATION RATE: 18 BRPM

## 2024-07-03 PROCEDURE — 99238 HOSP IP/OBS DSCHRG MGMT 30/<: CPT | Performed by: OBSTETRICS & GYNECOLOGY

## 2024-07-03 PROCEDURE — NC001 PR NO CHARGE: Performed by: OBSTETRICS & GYNECOLOGY

## 2024-07-03 RX ORDER — OXYCODONE HYDROCHLORIDE 10 MG/1
10 TABLET ORAL EVERY 6 HOURS PRN
Qty: 5 TABLET | Refills: 0
Start: 2024-07-03 | End: 2024-07-13

## 2024-07-03 RX ORDER — ENOXAPARIN SODIUM 100 MG/ML
40 INJECTION SUBCUTANEOUS
Status: DISCONTINUED | OUTPATIENT
Start: 2024-07-03 | End: 2024-07-03 | Stop reason: HOSPADM

## 2024-07-03 RX ORDER — IBUPROFEN 600 MG/1
600 TABLET ORAL EVERY 6 HOURS
Start: 2024-07-03

## 2024-07-03 RX ORDER — DOCUSATE SODIUM 100 MG/1
100 CAPSULE, LIQUID FILLED ORAL 2 TIMES DAILY
Start: 2024-07-03

## 2024-07-03 RX ORDER — ACETAMINOPHEN 325 MG/1
975 TABLET ORAL EVERY 6 HOURS PRN
Start: 2024-07-03

## 2024-07-03 RX ORDER — BENZOCAINE/MENTHOL 6 MG-10 MG
1 LOZENGE MUCOUS MEMBRANE DAILY PRN
Start: 2024-07-03

## 2024-07-03 RX ORDER — SIMETHICONE 80 MG
80 TABLET,CHEWABLE ORAL 4 TIMES DAILY PRN
Start: 2024-07-03

## 2024-07-03 RX ADMIN — KETOROLAC TROMETHAMINE 30 MG: 30 INJECTION, SOLUTION INTRAMUSCULAR; INTRAVENOUS at 07:49

## 2024-07-03 RX ADMIN — HYDROCORTISONE 1 APPLICATION: 1 CREAM TOPICAL at 00:44

## 2024-07-03 RX ADMIN — KETOROLAC TROMETHAMINE 30 MG: 30 INJECTION, SOLUTION INTRAMUSCULAR; INTRAVENOUS at 01:08

## 2024-07-03 RX ADMIN — KETOROLAC TROMETHAMINE 30 MG: 30 INJECTION, SOLUTION INTRAMUSCULAR; INTRAVENOUS at 14:58

## 2024-07-03 RX ADMIN — ACETAMINOPHEN 650 MG: 325 TABLET, FILM COATED ORAL at 07:49

## 2024-07-03 RX ADMIN — ACETAMINOPHEN 650 MG: 325 TABLET, FILM COATED ORAL at 14:58

## 2024-07-03 RX ADMIN — DOCUSATE SODIUM 100 MG: 100 CAPSULE, LIQUID FILLED ORAL at 07:49

## 2024-07-03 RX ADMIN — ENOXAPARIN SODIUM 40 MG: 40 INJECTION SUBCUTANEOUS at 07:49

## 2024-07-03 RX ADMIN — ACETAMINOPHEN 650 MG: 325 TABLET, FILM COATED ORAL at 01:08

## 2024-07-03 NOTE — PLAN OF CARE
Problem: POSTPARTUM  Goal: Experiences normal postpartum course  Description: INTERVENTIONS:  - Monitor maternal vital signs  - Assess uterine involution and lochia  7/3/2024 1538 by Jennifer Bailey RN  Outcome: Completed  7/3/2024 1158 by Jennifer Bailey RN  Outcome: Progressing  Goal: Appropriate maternal -  bonding  Description: INTERVENTIONS:  - Identify family support  - Assess for appropriate maternal/infant bonding   -Encourage maternal/infant bonding opportunities  - Referral to  or  as needed  7/3/2024 153 by Jennifer Bailey RN  Outcome: Completed  7/3/2024 1158 by Jennifer Bailey RN  Outcome: Progressing  Goal: Establishment of infant feeding pattern  Description: INTERVENTIONS:  - Assess breast/bottle feeding  - Refer to lactation as needed  7/3/2024 1538 by Jennifer Bailey RN  Outcome: Completed  7/3/2024 1158 by Jennifer Bailey RN  Outcome: Progressing  Goal: Incision(s), wounds(s) or drain site(s) healing without S/S of infection  Description: INTERVENTIONS  - Assess and document dressing, incision, wound bed, drain sites and surrounding tissue  - Provide patient and family education  - Perform skin care/dressing changes every   7/3/2024 1538 by Jennifer Bailey RN  Outcome: Completed  7/3/2024 1158 by Jennifer Bailey RN  Outcome: Progressing     Problem: PAIN - ADULT  Goal: Verbalizes/displays adequate comfort level or baseline comfort level  Description: Interventions:  - Encourage patient to monitor pain and request assistance  - Assess pain using appropriate pain scale  - Administer analgesics based on type and severity of pain and evaluate response  - Implement non-pharmacological measures as appropriate and evaluate response  - Consider cultural and social influences on pain and pain management  - Notify physician/advanced practitioner if interventions unsuccessful or patient reports new pain  7/3/2024 1538 by Jennifer Bailey  RN  Outcome: Completed  7/3/2024 1158 by Jennifer Bailey RN  Outcome: Progressing     Problem: INFECTION - ADULT  Goal: Absence or prevention of progression during hospitalization  Description: INTERVENTIONS:  - Assess and monitor for signs and symptoms of infection  - Monitor lab/diagnostic results  - Monitor all insertion sites, i.e. indwelling lines, tubes, and drains  - Monitor endotracheal if appropriate and nasal secretions for changes in amount and color  - [place] appropriate cooling/warming therapies per order  - Administer medications as ordered  - Instruct and encourage patient and family to use good hand hygiene technique  - Identify and instruct in appropriate isolation precautions for identified infection/condition  7/3/2024 1538 by Jennifer Bailey RN  Outcome: Completed  7/3/2024 1158 by Jennifer Bailey RN  Outcome: Progressing  Goal: Absence of fever/infection during neutropenic period  Description: INTERVENTIONS:  - Monitor WBC    7/3/2024 1538 by Jennifer Bailey RN  Outcome: Completed  7/3/2024 1158 by Jennifer Bailey RN  Outcome: Progressing     Problem: SAFETY ADULT  Goal: Patient will remain free of falls  Description: INTERVENTIONS:  - Educate patient/family on patient safety including physical limitations  - Instruct patient to call for assistance with activity   - Consult OT/PT to assist with strengthening/mobility   - Keep Call bell within reach  - Keep bed low and locked with side rails adjusted as appropriate  - Keep care items and personal belongings within reach  - Initiate and maintain comfort rounds  - Consider moving patient to room near nurses station  7/3/2024 1538 by Jennifer Bailey RN  Outcome: Completed  7/3/2024 1158 by Jennifer Bailey RN  Outcome: Progressing  Goal: Maintain or return to baseline ADL function  Description: INTERVENTIONS:  -  Assess patient's ability to carry out ADLs; assess patient's baseline for ADL function and identify physical  deficits which impact ability to perform ADLs (bathing, care of mouth/teeth, toileting, grooming, dressing, etc.)  - Assess/evaluate cause of self-care deficits   - Assess range of motion  - Assess patient's mobility; develop plan if impaired  - Assess patient's need for assistive devices and provide as appropriate  - Encourage maximum independence but intervene and supervise when necessary  - Involve family in performance of ADLs  - Assess for home care needs following discharge   - Consider OT consult to assist with ADL evaluation and planning for discharge  - Provide patient education as appropriate  7/3/2024 1538 by Jennifer Bailey RN  Outcome: Completed  7/3/2024 1158 by Jennifer Bailey RN  Outcome: Progressing  Goal: Maintains/Returns to pre admission functional level  Description: INTERVENTIONS:  - Perform AM-PAC 6 Click Basic Mobility/ Daily Activity assessment daily.  - Set and communicate daily mobility goal to care team and patient/family/caregiver.   - Collaborate with rehabilitation services on mobility goals if consulted  - Out of bed for toileting  - Record patient progress and toleration of activity level   7/3/2024 1538 by Jennifer Bailey RN  Outcome: Completed  7/3/2024 1158 by Jennifer Bailey RN  Outcome: Progressing     Problem: DISCHARGE PLANNING  Goal: Discharge to home or other facility with appropriate resources  Description: INTERVENTIONS:  - Identify barriers to discharge w/patient and caregiver  - Arrange for needed discharge resources and transportation as appropriate  - Identify discharge learning needs (meds, wound care, etc.)  - Arrange for interpretive services to assist at discharge as needed  - Refer to Case Management Department for coordinating discharge planning if the patient needs post-hospital services based on physician/advanced practitioner order or complex needs related to functional status, cognitive ability, or social support system  7/3/2024 1538 by  Jennifer Bailey, RN  Outcome: Completed  7/3/2024 1158 by Jennifer Bailey, RN  Outcome: Progressing

## 2024-07-03 NOTE — LACTATION NOTE
This note was copied from a baby's chart.  Provided 4 bottles of Donor Breast Milk    Lot #   Exp. Date  005225-3 (27)  2/28/25  005225-3 (23)  2/28/25 005225-2 (25)  2/28/25 005225-3 (12)  2/28/25    Epic msg sent for appt at [facility]

## 2024-07-03 NOTE — LACTATION NOTE
"This note was copied from a baby's chart.  Discharge Lactation: parents had baby in the nursery overnight. Ed. On DBM for home    Ed. On how to est. Milk supply    Feeding plan for home    Mom attempted to latch baby on the right breast in cross cradle hold. Latch with some NNS occurred. Hand pump after the feeding.     Enc. S2s, and NNS    Ed. On cluster feeding    Milk Supply:   - Allow for non-nutritive suck at the breast to stimulate supply   - Allow for skin to skin during and after each breastfeeding session   - Use massage, heat, and hand expression prior to feedings to assist with deep latch   - Increase pumping sessions and pump after every feeding    Information given and discussed on breastfeeding a late  infant.  Discussed sleepiness, maintaining body temperature, the lack of stamina necessitating shorter feedings. Encouraged feeding every 2-3 hours around the clock followed by hand expressing/pumping.    Provided education on growth spurts, when to introduce bottles; paced bottle feeding, and non-nutritive suck at the breast. Provided education on Signs of satiation. Encouraged to call lactation to observe a latch prior to discharge for reassurance. Encouraged to call baby and me with any questions and closely monitor output.    Nurse on demand: when baby gives hunger cues; when your breasts feel full, or at least every 3 hours during the day and every 5 hours at night counting from the beginning of one feeding to the beginning of the next; which ever comes first. When sucking and swallowing slow, gently compress the breast to restart flow. If active suck-swallow does not restart, gently remove the baby and offer the other breast; offering up to \"four\" breasts per feeding.    Information given and discussed on breastfeeding a late  infant.  Discussed sleepiness, maintaining body temperature, the lack of stamina necessitating shorter feedings. Encouraged feeding every 2-3 hours around the " "clock followed by hand expressing/pumping.      Discharge feeding plan    1. Meet early feeding cues  2. Use massage, warmth, hand expression / hand pump to stimulate breasts  3. Align nipple to nose, drag nipple to chin, (move baby not breast) and bring baby to breast when mouth is wide and deep latch is achieved. (Scan QR code for [employer] - positions)  4. Use breast compressions to stimulate suck  5. From \"U\" shape hold under the breast, move fingers to provide tea-cup hold of the breast at the lower anushka of baby's mouth  6. Once baby tires, or unlatches, feed any expressed milk via syringe / paced bottle feeding method.  7. Burp baby between the breasts.   8. Allow the baby to do non-nutritive suck and skin to skin at the breast  9. If baby does not meet feeding goals, pump after each feed to stimulate breasts and have expressed milk for next feed    When baby is not meeting feeding goals, use syringe with 5 [place] tube or the paced bottle feeding method for feeds. Review [employer] on Youtube or scan QR code for [employer] video        Milk Mob        [employer] - positions    [employer] on turning on the pump, press the 3 wavy lines to place pump on stimulation mode (high cycle, low vacuum) Set vacuum to comfort with light suction. After 3 min, press 3 wavy lines and change setting to Expression mode (low Cycle, High vacuum) Vacuum setting should not pinch, only tug the nipple. Now pump is set. Next time mom pumps, will only need to turn on pump and press 3 wavy line button to change cycle three times in a pumping session.   "

## 2024-07-03 NOTE — PLAN OF CARE

## 2024-07-03 NOTE — PLAN OF CARE

## 2024-07-03 NOTE — PROGRESS NOTES
Progress Note - OB/GYN   Naresh Sarabia 34 y.o. female MRN: [id number]  Unit/Bed#: -01 Encounter: [id number]      Assessment/Plan    Naresh Sarabia is a 34 y.o.  who is PPD 2 s/p LTCS at 39w1d     Diet controlled gestational diabetes mellitus (GDM) in third trimester  Assessment & Plan  Plan for 2 hr GTT 6 weeks post partum    Nonimmune to hepatitis B virus  Assessment & Plan  Offer vaccination post partum     Asthma  Assessment & Plan  Albuterol as needed  Avoid hemabate    * S/P primary low transverse   Assessment & Plan  , Hgb 11.4 --> 10.6  Sun removed, passed void trial  Pain: Tylenol and toradol scheduled, citlali 5/10 PRN    FEN: Tolerating regular diet  DVT ppx: SCDs and Lovenox 40mg qD  Passing flatus   Incision C/D/I             Disposition: Anticipate discharge home postpartum Day #2-4  Barriers to discharge: none      Subjective/Objective     Subjective: Postpartum state    Pain: no  Tolerating PO: yes  Voiding: yes  Flatus: yes  BM: yes  Ambulating: yes  Breastfeeding: Breastfeeding  Chest pain: no  Shortness of breath: no  Leg pain: no  Lochia: minimal    Objective:     Vitals:  Vitals:    24 1746 24 2046 24 0059 24 0441   BP: (!) 89/55 102/64 98/58 100/62   BP Location: Left arm Left arm Right arm Right arm   Pulse: 72 82 75 78   Resp: 18 20 20 20   Temp: 97.7 °F (36.5 °C) 97.7 °F (36.5 °C) 97.6 °F (36.4 °C) 97.7 °F (36.5 °C)   TempSrc: Oral Oral Oral Oral   SpO2: 97% 98% 98% 98%   Weight:       Height:           Physical Exam:   GEN: appears well, alert and oriented x 3, pleasant and cooperative   CV: Regular rate  RESP: non labored breathing  ABDOMEN: soft, no tenderness, no distention, Uterine fundus firm and non-tender, -1 cm below the umbilicus, incision c/d/i  EXTREMITIES: non-tender  NEURO Alert and oriented to person, place, and time.       Lab Results   Component Value Date    WBC 23.36 (H) 2024    HGB 10.6 (L) 2024     HCT 30.9 (L) 07/02/2024    MCV 95 07/02/2024     07/02/2024         Rocio Madsen MD  Obstetrics & Gynecology PGY-1  07/03/24  5:18 AM

## 2024-07-04 DIAGNOSIS — Z98.891 S/P PRIMARY LOW TRANSVERSE C-SECTION: Primary | ICD-10-CM

## 2024-07-04 RX ORDER — OXYCODONE HYDROCHLORIDE 5 MG/1
5 TABLET ORAL EVERY 6 HOURS PRN
Qty: 5 TABLET | Refills: 0 | Status: SHIPPED | OUTPATIENT
Start: 2024-07-04

## 2024-07-04 RX ORDER — IBUPROFEN 600 MG/1
600 TABLET ORAL EVERY 6 HOURS PRN
Qty: 30 TABLET | Refills: 0 | Status: SHIPPED | OUTPATIENT
Start: 2024-07-04

## 2024-07-08 ENCOUNTER — OFFICE VISIT (OUTPATIENT)
Dept: OBGYN CLINIC | Facility: CLINIC | Age: 34
End: 2024-07-08

## 2024-07-08 VITALS
BODY MASS INDEX: 32.25 KG/M2 | DIASTOLIC BLOOD PRESSURE: 83 MMHG | HEIGHT: 59 IN | SYSTOLIC BLOOD PRESSURE: 126 MMHG | WEIGHT: 160 LBS | TEMPERATURE: 98.3 F | HEART RATE: 80 BPM

## 2024-07-08 DIAGNOSIS — Z98.891 S/P PRIMARY LOW TRANSVERSE C-SECTION: Primary | ICD-10-CM

## 2024-07-08 LAB — PLACENTA IN STORAGE: NORMAL

## 2024-07-08 PROCEDURE — 99024 POSTOP FOLLOW-UP VISIT: CPT | Performed by: NURSE PRACTITIONER

## 2024-07-08 NOTE — PROGRESS NOTES
"POSTPARTUM INCISION CHECK VISIT    Naresh Sarabia presents today for postpartum incision check visit.  She had a primary  delivery on 24 for arrest of dilation. Pregnancy was complicated by GDM. She reports incision is healing well. She is breast feeding her infant and reports no issues with such. She was provided with [place] Depression Screening tool and her score was 0.    Review of Systems:   -Constitutional: denies issues, denies pain   -Breasts: denies tenderness   -Gynecologic: lochia light bleeding    -Urinary: denies issues urinating   -GI: stools WNL, denies issues    Physical Exam:   -Vitals:   Vitals:    24 0755   BP: 126/83   BP Location: Right arm   Patient Position: Sitting   Pulse: 80   Weight: 72.6 kg (160 lb)   Height: 4' 11\" (1.499 m)      -General: A&Ox3, no acute distress noted   -Abdomen: soft, non-tender, incision appears clean/dry/intact and well-healed   -Extremities: nontender, no edema noted   -Breasts:    R: nontender, no masses, no drainage    L: nontender, no masses, no drainage       Assessment/Plan:  1. Normal postpartum post op exam.  2. Depression screening negative.   3. Return in 3 weeks for post partum exam       "

## 2024-07-11 DIAGNOSIS — Z86.32 HISTORY OF GESTATIONAL DIABETES MELLITUS (GDM), NOT CURRENTLY PREGNANT: ICD-10-CM

## 2024-07-11 DIAGNOSIS — Z13.1 DIABETES MELLITUS SCREENING: Primary | ICD-10-CM

## 2024-07-11 NOTE — PROGRESS NOTES
Today's Date: 7/11/2024  Pt's Preferred Lab: None Specified  Ambulatory Order    Lab Ordered: 2hr (75GM) GTT   Reason: to screen for T2DM s/p delivery r/t H/O GDM  Time-Frame to be collected: 4-12 weeks postpartum    Patient Instructions: Fasting = Do NOT eat or drink anything except WATER for at least 8 hours before the test.    *For [facility], please call [phone] to schedule.   *To request lab be sent to alternative lab including [facility] or [facility], Call: [phone] or Message Diabetes Team via Tweetflow Message to EDE Yusuf RD   Diabetes Educator   [facility] - Maternal Fetal Medicine  Diabetes and Pregnancy Program

## 2024-07-29 ENCOUNTER — POSTPARTUM VISIT (OUTPATIENT)
Dept: OBGYN CLINIC | Facility: CLINIC | Age: 34
End: 2024-07-29

## 2024-07-29 VITALS
HEIGHT: 59 IN | SYSTOLIC BLOOD PRESSURE: 112 MMHG | HEART RATE: 87 BPM | BODY MASS INDEX: 30.22 KG/M2 | WEIGHT: 149.91 LBS | DIASTOLIC BLOOD PRESSURE: 71 MMHG

## 2024-07-29 DIAGNOSIS — J45.20 MILD INTERMITTENT ASTHMA WITHOUT COMPLICATION: ICD-10-CM

## 2024-07-29 DIAGNOSIS — Z78.9 NONIMMUNE TO HEPATITIS B VIRUS: ICD-10-CM

## 2024-07-29 DIAGNOSIS — Z86.32 HISTORY OF GESTATIONAL DIABETES MELLITUS: ICD-10-CM

## 2024-07-29 DIAGNOSIS — Z98.891 S/P PRIMARY LOW TRANSVERSE C-SECTION: Primary | ICD-10-CM

## 2024-07-29 PROBLEM — O98.511 COVID-19 AFFECTING PREGNANCY IN FIRST TRIMESTER: Status: RESOLVED | Noted: 2023-12-19 | Resolved: 2024-07-29

## 2024-07-29 PROBLEM — U07.1 COVID-19 AFFECTING PREGNANCY IN FIRST TRIMESTER: Status: RESOLVED | Noted: 2023-12-19 | Resolved: 2024-07-29

## 2024-07-29 NOTE — PROGRESS NOTES
[facility]  2024  Postpartum Visit      Assessment/Plan  Problem List       Asthma    Overview     Symptomatic 3x/week, attempts to avoid albuterol use and steroid inhaler use  Symptoms worst with pollen, dust         S/P primary low transverse     Current Assessment & Plan     Met criteria for arrest of dilation at 7 cm  Surgery was uncomplicated  Incision healing well         Nonimmune to hepatitis B virus    Overview     Discussed need for vaccine series         History of gestational diabetes mellitus    Overview     2 hour GTT ordered              Subjective    Naresh Sarabia is a 34 y.o. y.o. female who presents approximately one week post hospital discharge following a post  on 24. She is eating a regular diet without difficulty. Bowel movements are normal. Pain is controlled without any medications. The patient is breastfeeding.       The following portions of the patient's history were reviewed and updated as appropriate: allergies, current medications, past family history, past medical history, past social history, past surgical history, and problem list.    Allergies  Other    Medications    Current Outpatient Medications:     albuterol (PROVENTIL HFA,VENTOLIN HFA) 90 mcg/act inhaler, , Disp: , Rfl:     Blood Glucose Monitoring Suppl (OneTouch Verio Flex System) w/Device KIT, Test 4 times daily, Disp: 1 kit, Rfl: 0    ferrous sulfate 324 (65 Fe) mg, Take 1 tablet (324 mg total) by mouth daily before breakfast, Disp: 90 tablet, Rfl: 1    Prenat MV-Min w/Fe-Folate-DHA (PRENATAL COMPLETE PO), Take by mouth, Disp: , Rfl:     ibuprofen (MOTRIN) 600 mg tablet, Take 1 tablet (600 mg total) by mouth every 6 (six) hours (Patient not taking: Reported on 2024), Disp: , Rfl:     ibuprofen (MOTRIN) 600 mg tablet, Take 1 tablet (600 mg total) by mouth every 6 (six) hours as needed for mild pain (Patient not taking: Reported on 2024), Disp: 30 tablet, Rfl: 0     "oxyCODONE (Roxicodone) 5 immediate release tablet, Take 1 tablet (5 mg total) by mouth every 6 (six) hours as needed for moderate pain for up to 5 doses Max Daily Amount: 20 mg (Patient not taking: Reported on 7/8/2024), Disp: 5 tablet, Rfl: 0      Review of Systems  Denies Fevers/chills/N/V/constipation/ excessive vaginal bleeding/excessive pain/dysuria/frequency of urination. Also as noted in HPI.      Objective     /71   Pulse 87   Ht 4' 11\" (1.499 m)   Wt 68 kg (149 lb 14.6 oz)   LMP 10/01/2023   Breastfeeding Yes   BMI 30.28 kg/m²     General: alert and oriented, in no acute distress  Abdomen: soft, bowel sounds active, non-tender  Incision: healing well, no drainage, no erythema, no hernia, no seroma, no swelling, no dehiscence, incision well approximated     Vandana Richter MD   OB/Gyn PGY-4  2:44 PM  07/29/24    "

## 2024-10-02 ENCOUNTER — OFFICE VISIT (OUTPATIENT)
Dept: POSTPARTUM | Facility: CLINIC | Age: 34
End: 2024-10-02
Payer: COMMERCIAL

## 2024-10-02 VITALS — DIASTOLIC BLOOD PRESSURE: 84 MMHG | SYSTOLIC BLOOD PRESSURE: 130 MMHG

## 2024-10-02 PROCEDURE — 99205 OFFICE O/P NEW HI 60 MIN: CPT | Performed by: PEDIATRICS

## 2024-10-02 NOTE — PROGRESS NOTES
INITIAL BREAST FEEDING EVALUATION    Informant/Relationship: Kalyngiovana (Rupal) and Melly/mom and mom    Discussion of General Lactation Issues: Rupal states that breastfeeding has been painful from the beginning. There are times that is less painful, but it is currently not good. Dylan does show signs of frustration with pulling or twisting her head back and forth. 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 Rupal has been seeing PT and speech therapy. PT seems to have improved her neck tightness a little. Pediatricians had recommended some formula due to some concerns about her weight gain. A friend of the family had suggested that she might be tongue tied and her mothers have noted that she doesn't stick her tongue out. Rupal had cracking and bleeding for the first 2 weeks of breastfeeding. She continues to have intermittent cracks    Infant is 3 months old today.        History:  Fertility Problem:no  Breast changes:yes - sore, got bigger  : yes - c/s  due to FTP  Full term:yes - 39.1   labor:no  First nursing/attempt < 1 hour after birth: close to 3 hours, Rupal had general anesthesia  Skin to skin following delivery:yes - but not for a couple of hours  Breast changes after delivery:yes - 5-7 days; tingly and soreness  Rooming in (infant in room with mother with exception of procedures, eg. Circumcision: yes - went to the nursery the last night so mothers could sleep  Blood sugar issues:no  NICU stay:no  Jaundice:no  Phototherapy:no  Supplement given: (list supplement and method used as well as reason(s):yes - donor milk with syringe and bottle    Past Medical History:   Diagnosis Date    Anemia     Asthma     COVID-19 affecting pregnancy in first trimester 2023    Needs 28 and 34 wk growth ultrasound (OB)  Patient reports she was started on baby aspirin 81 mg daily for this indication.  But I would continue it since BMI is 30 pregravid and she is a primip as this may also  decrease her risk for developing preeclampsia.  At 29 weeks would not expect a benefit to increase her dose to 162 mg daily. Recommend stopping at 36 weeks and 0 days.      Diabetes mellitus (HCC)     gestational diabetes diet controlled    Varicella     immunization         Current Outpatient Medications:     albuterol (PROVENTIL HFA,VENTOLIN HFA) 90 mcg/act inhaler, , Disp: , Rfl:     Blood Glucose Monitoring Suppl (OneTouch Verio Flex System) w/Device KIT, Test 4 times daily, Disp: 1 kit, Rfl: 0    ferrous sulfate 324 (65 Fe) mg, Take 1 tablet (324 mg total) by mouth daily before breakfast, Disp: 90 tablet, Rfl: 1    ibuprofen (MOTRIN) 600 mg tablet, Take 1 tablet (600 mg total) by mouth every 6 (six) hours (Patient not taking: Reported on 7/29/2024), Disp: , Rfl:     ibuprofen (MOTRIN) 600 mg tablet, Take 1 tablet (600 mg total) by mouth every 6 (six) hours as needed for mild pain (Patient not taking: Reported on 7/8/2024), Disp: 30 tablet, Rfl: 0    oxyCODONE (Roxicodone) 5 immediate release tablet, Take 1 tablet (5 mg total) by mouth every 6 (six) hours as needed for moderate pain for up to 5 doses Max Daily Amount: 20 mg (Patient not taking: Reported on 7/8/2024), Disp: 5 tablet, Rfl: 0    Prenat MV-Min w/Fe-Folate-DHA (PRENATAL COMPLETE PO), Take by mouth, Disp: , Rfl:     Allergies   Allergen Reactions    Other Rash and Wheezing     seasonal       Social History     Substance and Sexual Activity   Drug Use Not Currently       Social History     Interval Breastfeeding History:    Frequency of breast feeding: at night, twice  Does mother feel breastfeeding is effective: not sure; feels that she transferring more from the breast  Does infant appear satisfied after nursing:If no, explain: not on the right  Stooling pattern normal: lYes  Urinating frequently:Yes  Using shield or shells: No    Alternative/Artificial Feedings:   Bottle: Yes, trying paced bottle feeding, using Dr. Canseco with a size 1 nipple  Cup:  No  Syringe/Finger: No           Formula Type: n/a                     Amount: n/a            Breast Milk:                      Amount: 4 oz (has taken up to 5 oz)            Frequency Q 2-3 Hr between feedings during the day  Elimination Problems: No      Equipment:  Nipple Shield             Type: n/a             Size: n/a             Frequency of Use: n/a  Pump            Type: Spectra and MomCozy (primarily uses the MomCozy)            Frequency of Use: 3-4 times/day; collects 6.5-8 oz each time; freezes an extra 4 oz/day  Shells            Type: n/a            Frequency of use: n/a    Equipment Problems: yes Spectra seems to be more effective, but MomCozy is more convenient    Mom:  Breast: Rounded, firm, normal  Nipple Assessment in General: Normal: elongated/eraser, no discoloration and no damage noted.  Mother's Awareness of Feeding Cues                 Recognizes: Yes                  Verbalizes: Yes  Support System: Melly Pickard's mother  History of Breastfeeding: none  Changes/Stressors/Violence: Breastfeeding pain, Alaia's ability to transfer milk; is Alaia tongue tied  Concerns/Goals: Rupal wishes to provide her milk as long as she can    Problems with Mom: Breastfeeding pain    Physical Exam  Constitutional:       Appearance: Normal appearance. She is well-developed and normal weight.   HENT:      Head: Normocephalic and atraumatic.   Eyes:      Extraocular Movements: Extraocular movements intact.   Neck:      Thyroid: No thyromegaly.   Cardiovascular:      Rate and Rhythm: Normal rate and regular rhythm.      Pulses: Normal pulses.      Heart sounds: Normal heart sounds. No murmur heard.  Pulmonary:      Effort: Pulmonary effort is normal.      Breath sounds: Normal breath sounds.   Musculoskeletal:      Cervical back: Normal range of motion and neck supple.   Lymphadenopathy:      Cervical: No cervical adenopathy.      Upper Body:      Right upper body: No pectoral adenopathy.      Left upper  body: No pectoral adenopathy.   Neurological:      General: No focal deficit present.      Mental Status: She is alert and oriented to person, place, and time.   Psychiatric:         Mood and Affect: Mood normal.         Behavior: Behavior normal.         Thought Content: Thought content normal.         Judgment: Judgment normal.   Vitals and nursing note reviewed.         Infant:  Behaviors: Alert  Color: Healthy  Birth we kgight: 3.16  Current weight: 5.035 kg    Problems with infant: Restricted tongue movement      General Appearance:  Alert, active, no distress                            Head:  Normocephalic, AFOF, sutures opposed                            Eyes:   Conjunctiva clear, no drainage                            Ears:   Normally placed, no anomolies                           Nose:   Septum intact, no drainage or erythema                          Mouth:  No lesions; tongue barely extends to the lip, lateralizes better to the right than the left with slight rolling of the contralateral edge, and does not lift with crying; there is little cupping of the examiner's finger and no peristalsis just a slight back and forth, piston-like movement; there is no seal on the examiner's finger; passive lift of the tongue reveals a frenulum that easily thins, attached mid tongue and along the entire length of the middle of the inferior alveolar ridge with a slight peak in the center                   Neck:  Supple, symmetrical, trachea midline, no adenopathy; thyroid: no enlargement, symmetric, no tenderness/mass/nodules                Respiratory:  No grunting, flaring, retractions, breath sounds clear and equal           Cardiovascular:  Regular rate and rhythm. No murmur. Adequate perfusion/capillary refill. Femoral pulse present                  Abdomen:    Soft, non-tender, no masses, bowel sounds present, no HSM            Genitourinary:  Normal female genitalia, anus patent                         Spine:   No  abnormalities noted       Musculoskeletal:   Full range of motion         Skin/Hair/Nails:   Skin warm, dry, and intact, no rashes or abnormal dyspigmentation or lesions               Neurologic:   No abnormal movement, tone appropriate for gestational age    [place] Latch:  Efficiency:               Lips Flanged: Yes, after frenotomy              Depth of latch: Excellent, after frenotomy              Audible Swallow: Yes, sustained SSB after frenotomy              Visible Milk: Yes, after frenotomy              Wide Open/ Asymmetrical: Yes, after frenotomy              Suck Swallow Cycle: Breathing: Unlabored, Coordinated: Yes  Nipple Assessment after latch: Normal: elongated/eraser, no discoloration and no damage noted.  Latch Problems: After the frenotomy, Rupal easily assisted Alaia to a wide, deep, asymmetrical, and comfortable attachment where she quickly attained a sustained SSB and  until content.    Position:  Infant's Ergonomics/Body               Body Alignment: Yes               Head Supported: Yes               Close to Mom's body/ Lifted/ Supported: Yes               Mom's Ergonomics/Body: Yes                           Supported: Yes                           Sitting Back: Yes                           Brings Baby to her breast: Yes  Positioning Problems: None        Education:  Reviewed Latch: Reviewed how to gently compress the breast as if offering a sandwich to facilitate a deeper latch.    Reviewed Positioning for Dyad: Reviewed how to bring baby to the breast so that her lower lip and chin touch the breast with her nose just above the nipple to encourage a wider, more asymmetric latch.   Reviewed Frequency/Supply & Demand: Reviewed the benefit of frequent milk removal to maintain milk production  Reviewed Alternative/Artificial Feedings: Paced bottle feeding  Reviewed Mom/Breast care: Reviewed the benefit of expressing breast milk frequently when not feeding at the breast to maintain  milk production  Reviewed Equipment: Reviewed how to determine flange size and use the settings on the pump to improve comfort and effectiveness of pumping.       Plan:  Discussed history and physical exams with Naresh. Support given for her commitment to providing breast milk for her baby. Discussed the findings on the baby's exam consistent with tongue tie and reviewed how this may be the cause of nipple trauma, nipple pain, nipple damage, poor milk transfer, blocked ducts, mastitis, and loss of milk production. Discussed the science that supports performing the frenotomy to improve latch.   Encouraged continued breastfeeding on demand as comfortable, expressing breast milk as often as she feels she is able. Reviewed the potential benefits of stimulating the breast either by breastfeeding or pumping every 3 hours during the day with no more than a 5-6 hour break at night.   Reviewed how to determine that pump flanges fit well and how best to use the pump settings to improve comfort and effectiveness of the pump.    I have spent 60 minutes with Patient and family today in which greater than 50% of this time was spent in counseling/coordination of care regarding Prognosis, Risks and benefits of tx options, Instructions for management, Patient and family education, Importance of tx compliance, Risk factor reductions, Impressions, Counseling / Coordination of care, Documenting in the medical record, Reviewing / ordering tests, medicine, procedures  , and Obtaining or reviewing history  .

## 2024-10-02 NOTE — PATIENT INSTRUCTIONS
Continue to breastfeed on demand or as comfortable.    Express breast milk as often as you have been when not feeding at the breast. It is often recommended to express every 3 hours during the day when  from the baby. You should be trying to meet or slightly exceed Alaia's needs with what you are expressing.     When expressing, the flange should fit so that your nipple moves freely in the tunnel of the flange with little to no areola joining it. Use the lowest effective suction and start on the massage setting. When milk flow starts, switch to the expression setting. When milk flow slows, switch back to the massage setting. When the flow starts again, switch back to the expression setting. You can finish pumping when the flow slows once again. A pumping session typically shouldn't take more than 15-20 minutes.    The pillow you used here is called the My BrestFriend.

## 2024-10-10 ENCOUNTER — OFFICE VISIT (OUTPATIENT)
Dept: POSTPARTUM | Facility: CLINIC | Age: 34
End: 2024-10-10
Payer: COMMERCIAL

## 2024-10-10 PROCEDURE — 99404 PREV MED CNSL INDIV APPRX 60: CPT | Performed by: PEDIATRICS

## 2024-10-10 NOTE — PATIENT INSTRUCTIONS
-Great meeting with you and your sweet family today.   -Watch for signs throughout the feeding that baby is effectively removing milk on the breast. You will feel strong tugging, hear swallowing and will feel your breasts get softer after nursing.   -No need to pump if baby is latching and feeding well at the breast on demand, even if only taking one side.   -Pump only as needed for missed feedings at the breast or for uncomfortable engorgement, utilize flange fit and settings that are comfortable for you, pumping should not be painful!   - Storing and Thawing Breast Milk  [facility] Breastfeeding Support (usda.gov)    -Please call or scheduled a follow up appointment with lactation as needed for questions or concerns you may have.

## 2024-10-10 NOTE — PROGRESS NOTES
BREAST FEEDING FOLLOW UP VISIT    Informant/Relationship: Rupal (mom/self), and Melly (MOB)    Discussion of General Lactation Issues: Frenotomy follow up. Mom feels she is eating better since the procedure. Baby is having some large spits recently.     Speech therapy yesterday said her tongue mobility seemed improved. PT on Monday.     Infant is 3 mo 9 days old today.    Interval Breastfeeding History:    Frequency of breast feeding: for every feeding when with Mom, at least 4 times per day   Does mother feel breastfeeding is effective: Yes  Does infant appear satisfied after nursing:Yes  Stooling pattern normal:Yes  Urinating frequently:Yes  Using shield or shells:No    Alternative/Artificial Feedings:   Bottle: Yes, Dr. Tarango's level 1 nipple, paced bottle feeding   Cup: No  Syringe/Finger: No           Formula Type: n/a                     Amount: n/a            Breast Milk:                      Amount: 4 ounces, can be up to 4.5-5 oz            Frequency Q 2 Hr between feedings when not with Glory - on demand   Elimination Problems: No, takes bottles well, in about 20-25 minutes       Equipment:    Pump            Type: Spectra and Momcozy, using Momcozy more while at work             Frequency of Use: when at work, 2-3 times     Also reports pumping the opposite breast if baby only nurses on one side.     Equipment Problems: no      Mom:  Breast: Normal  Nipple Assessment in General: Normal: elongated/eraser, no discoloration and no damage noted.  Mother's Awareness of Feeding Cues                 Recognizes: Yes                  Verbalizes: Yes  Support System: good support  History of Breastfeeding: first time breastfeeding   Changes/Stressors/Violence: Baby is s/p frenotomy.   Concerns/Goals: Rupal desires to breast feed for as long as she can.     Problems with Mom: none     Physical Exam  Constitutional:       Appearance: Normal appearance.   Pulmonary:      Effort: Pulmonary effort is normal.    Musculoskeletal:         General: Normal range of motion.      Cervical back: Normal range of motion.   Neurological:      General: No focal deficit present.      Mental Status: She is alert and oriented to person, place, and time.   Skin:     General: Skin is warm.      Capillary Refill: Capillary refill takes less than 2 seconds.   Psychiatric:         Mood and Affect: Mood normal.         Behavior: Behavior normal.         Thought Content: Thought content normal.         Judgment: Judgment normal.         Infant:  Behaviors: Alert  Color: Pink  Birth weight: 3160 g  Current weight: 5250 g    Problems with infant: none, s/p frenotomy       General Appearance:  Alert, active, no distress                            Head:  Normocephalic, AFOF, sutures opposed                            Eyes:   Conjunctiva clear, no drainage                            Ears:   Normally placed, no anomolies                           Nose:   Septum intact, no drainage or erythema                          Mouth:  No lesions. Tongue is at rest at the roof of her mouth, full cup on gloved finger, takes only a few sucks. Manual lift shows rounded tongue tip, well-healing frenotomy wound.                    Neck:  Supple, symmetrical, trachea midline,                Respiratory:  No grunting, flaring, retractions, breath sounds clear and equal           Cardiovascular:  Regular rate and rhythm. No murmur. Adequate perfusion/capillary refill. Femoral pulse present                  Abdomen:    Soft, non-tender, no masses, bowel sounds present, no HSM            Genitourinary:  Normal female genitalia, anus patent                         Spine:   No abnormalities noted       Musculoskeletal:   Full range of motion         Skin/Hair/Nails:   Skin warm, dry, and intact, no rashes or abnormal dyspigmentation or lesions               Neurologic:   No abnormal movement, tone appropriate for gestational age    [place] Latch:  Efficiency:                Lips Flanged: Yes              Depth of latch: wide              Audible Swallow: Yes              Visible Milk: Yes              Wide Open/ Asymmetrical: Yes              Suck Swallow Cycle: Breathing: yes, Coordinated: yes  Nipple Assessment after latch: Normal: elongated/eraser, no discoloration and no damage noted.  Latch Problems: None - wide latch achieved, Mom denies pain. Alaia feeds comfortably.     Position:  Infant's Ergonomics/Body               Body Alignment: Yes               Head Supported: Yes               Close to Mom's body/ Lifted/ Supported: No               Mom's Ergonomics/Body: No                           Supported: No                           Sitting Back: No                           Brings Baby to her breast: No  Positioning Problems: Reviewed BN hold, Mom is initially sitting forward brining breast to baby. She returns demonstration well and reports feeling much more comfortable and laid back hold.       Education:  Reviewed Latch: importance of deep latch without pain.   Reviewed Positioning for Dyad: proper alignment and head angle when positioning at the breast   Reviewed Frequency/Supply & Demand: offer the breast at each feeding, pump if baby is not latching and effective transferring milk.   Reviewed Infant:Cues and varied States of Awareness: watch for hunger cues, feed on demand. If baby seems satisfied at the breast (calm, relaxed sleeping, breasts are softer) no need to pump or supplement   Reviewed Infant Elimination: goal of 6+ wets and 2-3 stools per day   Reviewed Alternative/Artificial Feedings: paced bottle feeding technique demonstrated  Reviewed Mom/Breast care: gentle handling of the breast at all times  Reviewed Equipment: Hand pump and electric pump general guidance, Discussed proper flange fit, how to measure        Plan:      Reassurance provided that baby is growing well at this time and tongue appears to be healing well. Cont with positioning adjustments and  watch for signs of effective feeding. Pump if wanting to replace feeding at the breast with bottle feeding or if latching becomes painful. Gentle handling of the breast at all times to preserve integrity.  Contact [facility] for breastfeeding support as needed or ongoing concerns with latching comfort and milk transfer.     I have spent 60 minutes with Patient and family today in which greater than 50% of this time was spent in counseling/coordination of care regarding Patient and family education.

## 2024-10-16 NOTE — PROGRESS NOTES
I have reviewed the notes, assessments, and/or procedures performed by Zoey Reid RN, IBCLC, I concur with her/his documentation of Naresh Lacey MD 10/16/24

## (undated) DEVICE — SKIN MARKER DUAL TIP WITH RULER CAP, FLEXIBLE RULER AND LABELS: Brand: DEVON

## (undated) DEVICE — DRESSING TELFA 2 X 3 IN STRL

## (undated) DEVICE — GLOVE PI ULTRA TOUCH SZ.7.0

## (undated) DEVICE — SUT MONOCRYL 3-0 UNDYED KS CS-1 Y523H

## (undated) DEVICE — ABDOMINAL PAD: Brand: DERMACEA

## (undated) DEVICE — TELFA NON-ADHERENT ABSORBENT DRESSING: Brand: TELFA

## (undated) DEVICE — GLOVE INDICATOR PI UNDERGLOVE SZ 7 BLUE

## (undated) DEVICE — 3M™ STERI-STRIP™ REINFORCED ADHESIVE SKIN CLOSURES, R1547, 1/2 IN X 4 IN (12 MM X 100 MM), 6 STRIPS/ENVELOPE: Brand: 3M™ STERI-STRIP™

## (undated) DEVICE — 3M™ STERI-STRIP™ BLEND TONE SKIN CLOSURES, B1557, TAN, 1/2 IN X 4 IN (12MM X 100MM), 6 STRIPS/ENVELOPE: Brand: 3M™ STERI-STRIP™

## (undated) DEVICE — PACK C-SECTION PBDS

## (undated) DEVICE — SUT VICRYL 0 CT-1 27 IN J260H

## (undated) DEVICE — SUT VICRYL 0 CTX 36 IN J978H

## (undated) DEVICE — GAUZE SPONGES,16 PLY: Brand: CURITY

## (undated) DEVICE — SUT PLAIN 2-0 CTX 27 IN 872H

## (undated) DEVICE — Device

## (undated) DEVICE — CHLORAPREP HI-LITE 26ML ORANGE